# Patient Record
Sex: FEMALE | Race: WHITE | NOT HISPANIC OR LATINO | Employment: OTHER | URBAN - METROPOLITAN AREA
[De-identification: names, ages, dates, MRNs, and addresses within clinical notes are randomized per-mention and may not be internally consistent; named-entity substitution may affect disease eponyms.]

---

## 2023-08-18 NOTE — TELEPHONE ENCOUNTER
DIAGNOSIS: CARMEN patellofemoral syndromne    APPOINTMENT DATE: 09/13/2023   NOTES STATUS DETAILS   OFFICE NOTE from referring provider Care Everywhere 3/03/2023 [facility]    OFFICE NOTE from other specialist N/A    DISCHARGE SUMMARY from hospital N/A    DISCHARGE REPORT from the ER N/A    OPERATIVE REPORT N/A    EMG report N/A    MEDICATION LIST N/A    MRI N/A    DEXA (osteoporosis/bone health) N/A    CT SCAN N/A    XRAYS (IMAGES & REPORTS) Received 03/03/2023 bilateral knee     Images in PACS

## 2023-09-13 ENCOUNTER — PRE VISIT (OUTPATIENT)
Dept: ORTHOPEDICS | Facility: CLINIC | Age: 73
End: 2023-09-13

## 2023-09-13 ENCOUNTER — ANCILLARY PROCEDURE (OUTPATIENT)
Dept: GENERAL RADIOLOGY | Facility: CLINIC | Age: 73
End: 2023-09-13
Attending: PREVENTIVE MEDICINE
Payer: COMMERCIAL

## 2023-09-13 ENCOUNTER — OFFICE VISIT (OUTPATIENT)
Dept: ORTHOPEDICS | Facility: CLINIC | Age: 73
End: 2023-09-13
Payer: COMMERCIAL

## 2023-09-13 DIAGNOSIS — M25.562 BILATERAL CHRONIC KNEE PAIN: ICD-10-CM

## 2023-09-13 DIAGNOSIS — M54.16 LUMBAR RADICULAR PAIN: ICD-10-CM

## 2023-09-13 DIAGNOSIS — M54.16 LUMBAR RADICULAR PAIN: Primary | ICD-10-CM

## 2023-09-13 DIAGNOSIS — G89.29 BILATERAL CHRONIC KNEE PAIN: ICD-10-CM

## 2023-09-13 DIAGNOSIS — M17.0 PRIMARY OSTEOARTHRITIS OF BOTH KNEES: ICD-10-CM

## 2023-09-13 DIAGNOSIS — M25.561 BILATERAL CHRONIC KNEE PAIN: ICD-10-CM

## 2023-09-13 PROCEDURE — 99204 OFFICE O/P NEW MOD 45 MIN: CPT | Performed by: PREVENTIVE MEDICINE

## 2023-09-13 PROCEDURE — 72100 X-RAY EXAM L-S SPINE 2/3 VWS: CPT | Performed by: RADIOLOGY

## 2023-09-13 RX ORDER — LISINOPRIL 20 MG/1
20 TABLET ORAL DAILY
COMMUNITY

## 2023-09-13 RX ORDER — CALCIUM CARBONATE 500 MG/1
1 TABLET, CHEWABLE ORAL 2 TIMES DAILY
COMMUNITY

## 2023-09-13 RX ORDER — LORAZEPAM 1 MG/1
1 TABLET ORAL EVERY 6 HOURS PRN
COMMUNITY

## 2023-09-13 RX ORDER — SODIUM PHOSPHATE,MONO-DIBASIC 19G-7G/118
500 ENEMA (ML) RECTAL DAILY
COMMUNITY

## 2023-09-13 NOTE — PROGRESS NOTES
"HISTORY OF PRESENT ILLNESS  Ms. Hansen is a pleasant 72 year old year old female who presents to clinic today with the following:  What problem are you here for? Bilateral knee pain   How long have you had this problem? Chronic   And low back pain with some radicular symptoms  Has had cortisone injections in her knees in the past that have improved her knee pain symptoms for several months at a time, but the don't 'last'  Has completed PT for her knees without a great amount of improvement and currently using otc medications without much relief in knee pain    Have you had any recent imaging of this problem? Xrays/MRI/CT scans? Yes XR     Have you had treatments for this problem in the past?  -Medications? Aleve as needed   -Physical therapy? Yes  -Injections? Yes, has done gel and cortisone injections   -Surgery? no    How severe is this problem today? 0-10 scale? 1    How severe has this problem been at WORST in the past? 0-10 scale? 8    What do you think caused this problem? Is \"bone on bone\" in both knees     Does this problem or its symptoms cause difficulty for you falling asleep or staying asleep? Yes    Anything else you want us to know about this problem? no          MEDICAL HISTORY  There is no problem list on file for this patient.      No current outpatient medications on file.       Not on File    No family history on file.  Social History     Socioeconomic History    Marital status:        Additional medical/Social/Surgical histories reviewed in [facility] and updated as appropriate.     REVIEW OF SYSTEMS (9/13/2023)  10 point ROS of systems including Constitutional, Eyes, Respiratory, Cardiovascular, Gastroenterology, Genitourinary, Integumentary, Musculoskeletal, Psychiatric, Allergic/Immunologic were all negative except for pertinent positives noted in my HPI.     PHYSICAL EXAM  VSS  Vital Signs: There were no vitals taken for this visit. Patient declined being weighed. There is no height or " weight on file to calculate BMI.    General  - normal appearance, in no obvious distress  HEENT  - conjunctivae not injected, moist mucous membranes, normocephalic/atraumatic head, ears normal appearance, no lesions, mouth normal appearance, no scars, normal dentition and teeth present  CV  - normal popliteal pulse  Pulm  - normal respiratory pattern, non-labored  Musculoskeletal - bilateral knee  - stance: mildly antalgic gait, genu varum  - inspection: generalized swelling, trace effusion  - palpation: medial joint line tenderness, patella and patellar tendon non-tender, normal popliteal pulse  - ROM: 100 degrees flexion, 0 degrees extension, painful active ROM  - strength: 5/5 in flexion, 5/5 in extension  - neuro: no sensory or motor deficit  - special tests:  (-) Lachman  (-) anterior drawer  (-) posterior drawer  (-) pivot shift  (-) Jarvis  (-) Thessaly  (-) varus at 0 and 30 degrees flexion  (-) valgus at 0 and 30 degrees flexion  (+) Deion s compression test  (-) patellar apprehension  Neuro  - no sensory or motor deficit, grossly normal coordination, normal muscle tone  Skin  - no ecchymosis, erythema, warmth, or induration, no obvious rash  Psych  - interactive, appropriate, normal mood and affect   Lumbar: has some pain with flexion/extension, negative SLR today  ASSESSMENT & PLAN  71 yo female with bilateral knee arthritis, patellofemoral arthritis, chronic knee pain, and lumbar ddd, with radicular symptoms    I independently reviewed the following imaging studies:  Lumbar xrays: shows ddd  Bilateral knee xrays: shows arthritis, severe PF arthritis  Discussed and ordered HA injections to start treatment on her knees  Will consider lumbar MRI if low back and radicular symptoms get worse  Cont. Otc medications and voltaren gel PRN  Follow up for injections for knees once approved  Patient HAS in the past completed physical therapy for 4-6 weeks  Patient has been doing home exercise physical therapy  program for this problem      Appropriate PPE was utilized for prevention of spread of Covid-19.  Bruce Castillo MD, CAM

## 2023-09-13 NOTE — LETTER
"    9/13/2023         RE: Vernell Hansen  [address]  [facility] [address]        Dear Colleague,    Thank you for referring your patient, Vernlel Hansen, to the [facility] [place]. Please see a copy of my visit note below.    HISTORY OF PRESENT ILLNESS  Ms. Hansen is a pleasant 72 year old year old female who presents to clinic today with the following:  What problem are you here for? Bilateral knee pain   How long have you had this problem? Chronic   And low back pain with some radicular symptoms  Has had cortisone injections in her knees in the past that have improved her knee pain symptoms for several months at a time, but the don't 'last'  Has completed PT for her knees without a great amount of improvement and currently using otc medications without much relief in knee pain    Have you had any recent imaging of this problem? Xrays/MRI/CT scans? Yes XR     Have you had treatments for this problem in the past?  -Medications? Aleve as needed   -Physical therapy? Yes  -Injections? Yes, has done gel and cortisone injections   -Surgery? no    How severe is this problem today? 0-10 scale? 1    How severe has this problem been at WORST in the past? 0-10 scale? 8    What do you think caused this problem? Is \"bone on bone\" in both knees     Does this problem or its symptoms cause difficulty for you falling asleep or staying asleep? Yes    Anything else you want us to know about this problem? no          MEDICAL HISTORY  There is no problem list on file for this patient.      No current outpatient medications on file.       Not on File    No family history on file.  Social History     Socioeconomic History     Marital status:        Additional medical/Social/Surgical histories reviewed in [facility] and updated as appropriate.     REVIEW OF SYSTEMS (9/13/2023)  10 point ROS of systems including Constitutional, Eyes, Respiratory, Cardiovascular, Gastroenterology, Genitourinary, " Integumentary, Musculoskeletal, Psychiatric, Allergic/Immunologic were all negative except for pertinent positives noted in my HPI.     PHYSICAL EXAM  VSS  Vital Signs: There were no vitals taken for this visit. Patient declined being weighed. There is no height or weight on file to calculate BMI.    General  - normal appearance, in no obvious distress  HEENT  - conjunctivae not injected, moist mucous membranes, normocephalic/atraumatic head, ears normal appearance, no lesions, mouth normal appearance, no scars, normal dentition and teeth present  CV  - normal popliteal pulse  Pulm  - normal respiratory pattern, non-labored  Musculoskeletal - bilateral knee  - stance: mildly antalgic gait, genu varum  - inspection: generalized swelling, trace effusion  - palpation: medial joint line tenderness, patella and patellar tendon non-tender, normal popliteal pulse  - ROM: 100 degrees flexion, 0 degrees extension, painful active ROM  - strength: 5/5 in flexion, 5/5 in extension  - neuro: no sensory or motor deficit  - special tests:  (-) Lachman  (-) anterior drawer  (-) posterior drawer  (-) pivot shift  (-) Jarvis  (-) Thessaly  (-) varus at 0 and 30 degrees flexion  (-) valgus at 0 and 30 degrees flexion  (+) Deion s compression test  (-) patellar apprehension  Neuro  - no sensory or motor deficit, grossly normal coordination, normal muscle tone  Skin  - no ecchymosis, erythema, warmth, or induration, no obvious rash  Psych  - interactive, appropriate, normal mood and affect   Lumbar: has some pain with flexion/extension, negative SLR today  ASSESSMENT & PLAN  73 yo female with bilateral knee arthritis, patellofemoral arthritis, chronic knee pain, and lumbar ddd, with radicular symptoms    I independently reviewed the following imaging studies:  Lumbar xrays: shows ddd  Bilateral knee xrays: shows arthritis, severe PF arthritis  Discussed and ordered HA injections to start treatment on her knees  Will consider lumbar  MRI if low back and radicular symptoms get worse  Cont. Otc medications and voltaren gel PRN  Follow up for injections for knees once approved  Patient HAS in the past completed physical therapy for 4-6 weeks  Patient has been doing home exercise physical therapy program for this problem      Appropriate PPE was utilized for prevention of spread of Covid-19.  Bruce Castillo MD, CAQSM        Again, thank you for allowing me to participate in the care of your patient.        Sincerely,        Bruce Castillo MD

## 2023-09-15 ENCOUNTER — TELEPHONE (OUTPATIENT)
Dept: ORTHOPEDICS | Facility: CLINIC | Age: 73
End: 2023-09-15
Payer: COMMERCIAL

## 2023-09-15 NOTE — TELEPHONE ENCOUNTER
[facility] Call Center    Phone Message    May a detailed message be left on voicemail: yes     Reason for Call: Other: Patient would like to know if she's responsible for the prior authorization or who handles that? Please call patient back.     Action Taken: Message routed to:  [facility]  Sports Medicine    Travel Screening: Not Applicable

## 2023-09-18 ENCOUNTER — TELEPHONE (OUTPATIENT)
Dept: ORTHOPEDICS | Facility: CLINIC | Age: 73
End: 2023-09-18

## 2023-09-18 NOTE — TELEPHONE ENCOUNTER
Called and left message. Informed patient they are approved for their gel injections.        William Bonilla, EMT

## 2023-09-18 NOTE — TELEPHONE ENCOUNTER
Called and left message for patient, let her know that we are waiting to hear if he injection is approved still.        William Bonilla, EMT

## 2023-09-27 ENCOUNTER — TELEPHONE (OUTPATIENT)
Dept: ORTHOPEDICS | Facility: CLINIC | Age: 73
End: 2023-09-27
Payer: COMMERCIAL

## 2023-09-27 NOTE — TELEPHONE ENCOUNTER
[facility] Call Center    Phone Message    May a detailed message be left on voicemail: yes     Reason for Call: Other: Pt calling regarding injection questions would like to speak to member of care team     Action Taken: Message routed to:  Adult Clinics: Sports Medicine p [phone]    Travel Screening: Not Applicable

## 2023-09-27 NOTE — TELEPHONE ENCOUNTER
Called and spoke to patient, helped answer some questions she had about her upcoming appointment, in regards to cortisone versus euflexxa injections.      William Bonilla, EMT

## 2023-09-29 ENCOUNTER — THERAPY VISIT (OUTPATIENT)
Dept: PHYSICAL THERAPY | Facility: CLINIC | Age: 73
End: 2023-09-29
Payer: COMMERCIAL

## 2023-09-29 DIAGNOSIS — G89.29 CHRONIC PAIN OF BOTH KNEES: ICD-10-CM

## 2023-09-29 DIAGNOSIS — M25.562 BILATERAL KNEE PAIN: Primary | ICD-10-CM

## 2023-09-29 DIAGNOSIS — M25.561 BILATERAL KNEE PAIN: Primary | ICD-10-CM

## 2023-09-29 DIAGNOSIS — M25.562 CHRONIC PAIN OF BOTH KNEES: ICD-10-CM

## 2023-09-29 DIAGNOSIS — M54.16 LUMBAR RADICULAR PAIN: ICD-10-CM

## 2023-09-29 DIAGNOSIS — M25.561 CHRONIC PAIN OF BOTH KNEES: ICD-10-CM

## 2023-09-29 PROCEDURE — 97161 PT EVAL LOW COMPLEX 20 MIN: CPT | Mod: GP | Performed by: PHYSICAL THERAPIST

## 2023-09-29 PROCEDURE — 97110 THERAPEUTIC EXERCISES: CPT | Mod: GP | Performed by: PHYSICAL THERAPIST

## 2023-09-29 NOTE — PROGRESS NOTES
"PHYSICAL THERAPY EVALUATION  Type of Visit: Evaluation    See electronic medical record for Abuse and Falls Screening details.    Subjective       Presenting condition or subjective complaint: Bone on bone pain in both knees for many years. Stiffness has worsened as well as intensity and frequency of pain. She also reports low back pain across her central low back causing her to stoop forward and \"walk funny\". She reports throbbing pain into both lower legs at night especially that is a different pain than her knee joint pain.  Date of onset: 05/29/23    Relevant medical history:     Dates & types of surgery:      Prior diagnostic imaging/testing results:       Prior therapy history for the same diagnosis, illness or injury:        Prior Level of Function  Transfers:   Ambulation:   ADL:   IADL:     Living Environment  Social support:     Type of home:     Stairs to enter the home:         Ramp:     Stairs inside the home:         Help at home:    Equipment owned:       Employment:      Hobbies/Interests:       Patient goals for therapy:      Pain assessment: See objective evaluation for additional pain details     Objective   KNEE EVALUATION  PAIN: Pain Level at Rest: 8/10  Pain Level with Use: 4/10  Pain Location: knee, ankle, and foot   Pain Quality: throbbing  Pain Frequency: only at night in lower legs and with any bending related activities or prolonged walking with knees (but not every day)  Pain is Worst: nighttime  Pain is Exacerbated By: pain in most of lower leg at night when trying to sleep (different than knee pain); not sure if movement or position changes affect pain. Knee pain hurts with squats, stairs and too much walking  Pain is Relieved By: not sure  Pain Progression: Worsened    INTEGUMENTARY (edema, incisions):   POSTURE:   GAIT:  Weightbearing Status: WBAT  Assistive Device(s): None  Gait Deviations: Antalgic  BALANCE/PROPRIOCEPTION: WFL  WEIGHTBEARING ALIGNMENT:   NON-WEIGHTBEARING ALIGNMENT: "   ROM:   (Degrees) Left AROM Left PROM  Right AROM Right PROM   Knee Flexion 128 130 130 133   Knee Extension neutral  neutral    Pain:   End feel:       MDT for low back:  Flexion to floor (hamstring pulling); extension min loss without pain  SGIS min loss each direction (no pain)  -Repeated flexion: NE  -Repeated extension in standing: NE  -Prone on elbows: 2 minutes NE  -Knee to chest in supine: NE  STRENGTH:   Pain: - none + mild ++ moderate +++ severe  Strength Scale: 0-5/5 Left Right   Knee Flexion 5 5   Knee Extension 4 4   Quad Set 5 5     FLEXIBILITY: WNL  SPECIAL TESTS:  -Slump and SLR  FUNCTIONAL TESTS: Double Leg Squat: Anterior knee translation, Improper use of glutes/hips, and 50% of normal depth  PALPATION:  Superior patella and distal quad broadly (minor), no other focal tenderness (including low back today)  JOINT MOBILITY: hypomobile L3-5, L patella slightly hypomobile in all directions compared to right    Assessment & Plan   CLINICAL IMPRESSIONS  Medical Diagnosis: Lumbar radicular pain    Treatment Diagnosis: Bilateral knee pain, pain into both legs, low back pain   Impression/Assessment: Patient is a 72 year old female with knee/lower leg complaints.  The following significant findings have been identified: Pain, Decreased ROM/flexibility, Decreased joint mobility, Decreased strength, Impaired muscle performance, Decreased activity tolerance, and Impaired posture. These impairments interfere with their ability to perform self care tasks, recreational activities, household chores, and community mobility as compared to previous level of function.     Clinical Decision Making (Complexity):  Clinical Presentation: Stable/Uncomplicated  Clinical Presentation Rationale: based on medical and personal factors listed in PT evaluation  Clinical Decision Making (Complexity): Low complexity    PLAN OF CARE  Treatment Interventions:  Interventions: Manual Therapy, Neuromuscular Re-education, Therapeutic  Activity, Therapeutic Exercise, Self-Care/Home Management    Long Term Goals     PT Goal 1  Goal Identifier: LTG 1  Goal Description: Patient will be able to sleep throughout night without pain into either leg  Rationale: to maximize safety and independence with self cares  Target Date: 11/24/23      Frequency of Treatment: 1x/week  Duration of Treatment: 4 weeks then 2x/month for 2 months    Recommended Referrals to Other Professionals:  none  Education Assessment:   Learner/Method: No Barriers to Learning;Pictures/Video;Demonstration;Reading;Listening;Patient    Risks and benefits of evaluation/treatment have been explained.   Patient/Family/caregiver agrees with Plan of Care.     Evaluation Time:     PT Eval, Low Complexity Minutes (78091): 25       Signing Clinician: NINOSKA [facility]                                                                                   OUTPATIENT PHYSICAL THERAPY      PLAN OF TREATMENT FOR OUTPATIENT REHABILITATION   Patient's Last Name, First Name, Vernell Mary YOB: 1950   Provider's Name   [facility]   Medical Record No.  [id number]     Onset Date: 05/29/23  Start of Care Date: 09/29/23     Medical Diagnosis:  Lumbar radicular pain      PT Treatment Diagnosis:  Bilateral knee pain, pain into both legs, low back pain Plan of Treatment  Frequency/Duration: 1x/week/ 4 weeks then 2x/month for 2 months    Certification date from 09/29/23 to 12/22/23         See note for plan of treatment details and functional goals     True Reddy, PT                         I CERTIFY THE NEED FOR THESE SERVICES FURNISHED UNDER        THIS PLAN OF TREATMENT AND WHILE UNDER MY CARE     (Physician attestation of this document indicates review and certification of the therapy plan).                Referring Provider:  Bruce Castillo      Initial Assessment  See Epic Evaluation- Start of  Care Date: 09/29/23

## 2023-10-04 ENCOUNTER — OFFICE VISIT (OUTPATIENT)
Dept: ORTHOPEDICS | Facility: CLINIC | Age: 73
End: 2023-10-04
Payer: COMMERCIAL

## 2023-10-04 DIAGNOSIS — M17.0 ARTHRITIS OF BOTH KNEES: Primary | ICD-10-CM

## 2023-10-04 PROCEDURE — 99207 PR DROP WITH A PROCEDURE: CPT | Performed by: PREVENTIVE MEDICINE

## 2023-10-04 PROCEDURE — 20610 DRAIN/INJ JOINT/BURSA W/O US: CPT | Mod: 50 | Performed by: PREVENTIVE MEDICINE

## 2023-10-04 RX ORDER — TRIAMCINOLONE ACETONIDE 40 MG/ML
40 INJECTION, SUSPENSION INTRA-ARTICULAR; INTRAMUSCULAR
Status: SHIPPED | OUTPATIENT
Start: 2023-10-04

## 2023-10-04 RX ADMIN — TRIAMCINOLONE ACETONIDE 40 MG: 40 INJECTION, SUSPENSION INTRA-ARTICULAR; INTRAMUSCULAR at 14:52

## 2023-10-04 NOTE — PROGRESS NOTES
HISTORY OF PRESENT ILLNESS  Ms. Hansen is a pleasant 72 year old year old female who presents to clinic today with the following:  What problem are you here for? Bilateral knee cortisone   Has been approved to start euflexxa, however, she has a trip coming up and her knees have both been more sore with walking and she would like to get cortisone injections today and followup for euflexxa injections after her trip  How long have you had this problem? Chronic     Have you had any recent imaging of this problem? Xrays/MRI/CT scans? Yes     Have you had treatments for this problem in the past?  -Medications? Aleve   -Physical therapy? Yes   -Injections? Yes   -Surgery? no    How severe is this problem today? 0-10 scale? 1    How severe has this problem been at WORST in the past? 0-10 scale? 8    What do you think caused this problem?     Does this problem or its symptoms cause difficulty for you falling asleep or staying asleep? Primary OA in both knees     Anything else you want us to know about this problem? Na           MEDICAL HISTORY  Patient Active Problem List   Diagnosis    Bilateral knee pain    Lumbar radicular pain    Chronic pain of both knees       Current Outpatient Medications   Medication Sig Dispense Refill    calcium carbonate (TUMS) 500 MG chewable tablet Take 1 chew tab by mouth 2 times daily      glucosamine 500 MG CAPS capsule Take 500 mg by mouth daily      lisinopril (ZESTRIL) 20 MG tablet Take 20 mg by mouth daily      LORazepam (ATIVAN) 1 MG tablet Take 1 mg by mouth every 6 hours as needed for anxiety         No Known Allergies    No family history on file.  Social History     Socioeconomic History    Marital status:        Additional medical/Social/Surgical histories reviewed in [facility] and updated as appropriate.     REVIEW OF SYSTEMS (10/4/2023)  10 point ROS of systems including Constitutional, Eyes, Respiratory, Cardiovascular, Gastroenterology, Genitourinary, Integumentary,  Musculoskeletal, Psychiatric, Allergic/Immunologic were all negative except for pertinent positives noted in my HPI.     PHYSICAL EXAM  VSS    General  - normal appearance, in no obvious distress  HEENT  - conjunctivae not injected, moist mucous membranes, normocephalic/atraumatic head, ears normal appearance, no lesions, mouth normal appearance, no scars, normal dentition and teeth present  CV  - normal popliteal pulse  Pulm  - normal respiratory pattern, non-labored  Musculoskeletal - bilateral knee  - stance: mildly antalgic gait, genu varum  - inspection: no generalized swelling, trace effusion  - palpation: medial joint line tenderness, patella and patellar tendon non-tender, normal popliteal pulse  - ROM: 100 degrees flexion, 0 degrees extension, painful active ROM  - strength: 5/5 in flexion, 5/5 in extension  - neuro: no sensory or motor deficit  - special tests:  (-) Lachman  (-) anterior drawer  (-) posterior drawer  (-) pivot shift  (-) Jarvis  (-) Thessaly  (-) varus at 0 and 30 degrees flexion  (-) valgus at 0 and 30 degrees flexion  (+) Deion s compression test  (-) patellar apprehension  Neuro  - no sensory or motor deficit, grossly normal coordination, normal muscle tone  Skin  - no ecchymosis, erythema, warmth, or induration, no obvious rash  Psych  - interactive, appropriate, normal mood and affect      ASSESSMENT & PLAN  71 yo female with bilateral knee arthritis, worse    I independently reviewed the following imaging studies:  Bilateral knee xrays: shows arthritis  After a 20 minute discussion and examination, we decided to perform a same day injection for diagnostic and therapeutic purposes for  Bilateral knees  Discussed and will start euflexxa later this month  Patient has been doing home exercise physical therapy program for this problem      Appropriate PPE was utilized for prevention of spread of Covid-19.  Bruec Castillo MD, CAQSM      PROCEDURE:      bilateral      Knee joint injection    The patient was apprised of the risks and the benefits of the procedure and consented and a written consent was signed.   The patient s  KNEEs were sterilely prepped with chloraprep.   40 mg of triamcinolone suspension was drawn up into a 5 mL syringe with 4 mL of 1% lidocaine for each knee  The patient was injected into the R and L knee with a 1.5-inch 22-gauge needle at the_superiorlateral aspect of their knee joints  There were no complications. The patient tolerated the procedure well. There was minimal bleeding.   The patient was instructed to ice the knees upon leaving clinic and refrain from overuse over the next 3 days.   The patient was instructed to go to the emergency room with any usual pain, swelling, or redness occurred in the injected area.   The patient was given a followup appointment to evaluate response to the injection to their increased range of motion and reduction of pain.  Follow up PRN  Dr Bruce Castillo   Large Joint Injection/Arthocentesis: bilateral knee    Date/Time: 10/4/2023 2:52 PM    Performed by: Bruce Castillo MD  Authorized by: Bruce Castillo MD    Indications:  Osteoarthritis and pain  Needle Size:  22 G  Guidance: landmark guided    Approach:  Superolateral  Location:  Knee  Laterality:  Bilateral      Medications (Right):  40 mg triamcinolone 40 MG/ML  Medications (Left):  40 mg triamcinolone 40 MG/ML  Outcome:  Tolerated well, no immediate complications  Procedure discussed: discussed risks, benefits, and alternatives    Consent Given by:  Patient  Timeout: timeout called immediately prior to procedure    Prep: patient was prepped and draped in usual sterile fashion

## 2023-10-04 NOTE — LETTER
10/4/2023         RE: Vernell Hansen  [address]  [facility] [address]        Dear Colleague,    Thank you for referring your patient, Vernell Hansen, to the [facility]IN[facility] [place]. Please see a copy of my visit note below.    HISTORY OF PRESENT ILLNESS  Ms. Hansen is a pleasant 72 year old year old female who presents to clinic today with the following:  What problem are you here for? Bilateral knee cortisone   Has been approved to start euflexxa, however, she has a trip coming up and her knees have both been more sore with walking and she would like to get cortisone injections today and followup for euflexxa injections after her trip  How long have you had this problem? Chronic     Have you had any recent imaging of this problem? Xrays/MRI/CT scans? Yes     Have you had treatments for this problem in the past?  -Medications? Aleve   -Physical therapy? Yes   -Injections? Yes   -Surgery? no    How severe is this problem today? 0-10 scale? 1    How severe has this problem been at WORST in the past? 0-10 scale? 8    What do you think caused this problem?     Does this problem or its symptoms cause difficulty for you falling asleep or staying asleep? Primary OA in both knees     Anything else you want us to know about this problem? Na           MEDICAL HISTORY  Patient Active Problem List   Diagnosis     Bilateral knee pain     Lumbar radicular pain     Chronic pain of both knees       Current Outpatient Medications   Medication Sig Dispense Refill     calcium carbonate (TUMS) 500 MG chewable tablet Take 1 chew tab by mouth 2 times daily       glucosamine 500 MG CAPS capsule Take 500 mg by mouth daily       lisinopril (ZESTRIL) 20 MG tablet Take 20 mg by mouth daily       LORazepam (ATIVAN) 1 MG tablet Take 1 mg by mouth every 6 hours as needed for anxiety         No Known Allergies    No family history on file.  Social History     Socioeconomic History     Marital status:         Additional medical/Social/Surgical histories reviewed in [facility] and updated as appropriate.     REVIEW OF SYSTEMS (10/4/2023)  10 point ROS of systems including Constitutional, Eyes, Respiratory, Cardiovascular, Gastroenterology, Genitourinary, Integumentary, Musculoskeletal, Psychiatric, Allergic/Immunologic were all negative except for pertinent positives noted in my HPI.     PHYSICAL EXAM  VSS    General  - normal appearance, in no obvious distress  HEENT  - conjunctivae not injected, moist mucous membranes, normocephalic/atraumatic head, ears normal appearance, no lesions, mouth normal appearance, no scars, normal dentition and teeth present  CV  - normal popliteal pulse  Pulm  - normal respiratory pattern, non-labored  Musculoskeletal - bilateral knee  - stance: mildly antalgic gait, genu varum  - inspection: no generalized swelling, trace effusion  - palpation: medial joint line tenderness, patella and patellar tendon non-tender, normal popliteal pulse  - ROM: 100 degrees flexion, 0 degrees extension, painful active ROM  - strength: 5/5 in flexion, 5/5 in extension  - neuro: no sensory or motor deficit  - special tests:  (-) Lachman  (-) anterior drawer  (-) posterior drawer  (-) pivot shift  (-) Jarvis  (-) Thessaly  (-) varus at 0 and 30 degrees flexion  (-) valgus at 0 and 30 degrees flexion  (+) Deion s compression test  (-) patellar apprehension  Neuro  - no sensory or motor deficit, grossly normal coordination, normal muscle tone  Skin  - no ecchymosis, erythema, warmth, or induration, no obvious rash  Psych  - interactive, appropriate, normal mood and affect      ASSESSMENT & PLAN  73 yo female with bilateral knee arthritis, worse    I independently reviewed the following imaging studies:  Bilateral knee xrays: shows arthritis  After a 20 minute discussion and examination, we decided to perform a same day injection for diagnostic and therapeutic purposes for  Bilateral knees  Discussed  and will start euflexxa later this month  Patient has been doing home exercise physical therapy program for this problem      Appropriate PPE was utilized for prevention of spread of Covid-19.  Bruce Castillo MD, [facility]      PROCEDURE:      bilateral      Knee joint injection   The patient was apprised of the risks and the benefits of the procedure and consented and a written consent was signed.   The patient s  KNEEs were sterilely prepped with chloraprep.   40 mg of triamcinolone suspension was drawn up into a 5 mL syringe with 4 mL of 1% lidocaine for each knee  The patient was injected into the R and L knee with a 1.5-inch 22-gauge needle at the_superiorlateral aspect of their knee joints  There were no complications. The patient tolerated the procedure well. There was minimal bleeding.   The patient was instructed to ice the knees upon leaving clinic and refrain from overuse over the next 3 days.   The patient was instructed to go to the emergency room with any usual pain, swelling, or redness occurred in the injected area.   The patient was given a followup appointment to evaluate response to the injection to their increased range of motion and reduction of pain.  Follow up PRN  Dr Bruce Castillo   Large Joint Injection/Arthocentesis: bilateral knee    Date/Time: 10/4/2023 2:52 PM    Performed by: Bruce Castillo MD  Authorized by: Bruce Castillo MD    Indications:  Osteoarthritis and pain  Needle Size:  22 G  Guidance: landmark guided    Approach:  Superolateral  Location:  Knee  Laterality:  Bilateral      Medications (Right):  40 mg triamcinolone 40 MG/ML  Medications (Left):  40 mg triamcinolone 40 MG/ML  Outcome:  Tolerated well, no immediate complications  Procedure discussed: discussed risks, benefits, and alternatives    Consent Given by:  Patient  Timeout: timeout called immediately prior to procedure    Prep: patient was prepped and draped in usual sterile fashion          Again, thank  you for allowing me to participate in the care of your patient.        Sincerely,        Bruce Castillo MD

## 2023-10-13 ENCOUNTER — THERAPY VISIT (OUTPATIENT)
Dept: PHYSICAL THERAPY | Facility: CLINIC | Age: 73
End: 2023-10-13
Payer: COMMERCIAL

## 2023-10-13 DIAGNOSIS — M25.562 CHRONIC PAIN OF BOTH KNEES: Primary | ICD-10-CM

## 2023-10-13 DIAGNOSIS — G89.29 CHRONIC PAIN OF BOTH KNEES: Primary | ICD-10-CM

## 2023-10-13 DIAGNOSIS — M25.561 CHRONIC PAIN OF BOTH KNEES: Primary | ICD-10-CM

## 2023-10-13 PROCEDURE — 97110 THERAPEUTIC EXERCISES: CPT | Mod: GP | Performed by: PHYSICAL THERAPIST

## 2023-10-25 ENCOUNTER — OFFICE VISIT (OUTPATIENT)
Dept: ORTHOPEDICS | Facility: CLINIC | Age: 73
End: 2023-10-25
Payer: COMMERCIAL

## 2023-10-25 DIAGNOSIS — M17.0 ARTHRITIS OF BOTH KNEES: Primary | ICD-10-CM

## 2023-10-25 PROCEDURE — 99207 PR DROP WITH A PROCEDURE: CPT | Performed by: PREVENTIVE MEDICINE

## 2023-10-25 PROCEDURE — 20610 DRAIN/INJ JOINT/BURSA W/O US: CPT | Mod: 50 | Performed by: PREVENTIVE MEDICINE

## 2023-10-25 RX ADMIN — Medication 20 MG: at 15:10

## 2023-10-25 NOTE — LETTER
10/25/2023         RE: Vernell Hansen  [address]  [facility] [address]        Dear Colleague,    Thank you for referring your patient, Vernell Hansen, to the [facility] [place]. Please see a copy of my visit note below.    Vernell returns for bilateral knee injections with euflexxa #1  Diagnosis: bilateral knee arthritis    PROCEDURE:           bilateral  Knee joint injection with euflexxa #1    The patient was apprised of the risks and the benefits of the procedure and consented and a written consent was signed.   The patient s  KNEEs were sterilely prepped with chloraprep.     The patient was injected with euflexxa syringe into the right and left knee with a 1.5-inch 22-gauge needle at the_superiorlateral aspect of their knee joint    There were no complications. The patient tolerated the procedure well. There was minimal bleeding.   The patient was instructed to ice the knees upon leaving clinic and refrain from overuse over the next 3 days.   The patient was instructed to go to the emergency room with any usual pain, swelling, or redness occurred in the injected area.   The patient was given a followup appointment to evaluate response to the injection to their increased range of motion and reduction of pain.  Follow up for euflexxa  Dr Bruce Castillo       Large Joint Injection/Arthocentesis: bilateral knee    Date/Time: 10/25/2023 3:10 PM    Performed by: Bruce Castillo MD  Authorized by: Bruce Castillo MD    Indications:  Pain and osteoarthritis  Needle Size:  22 G  Guidance: landmark guided    Approach:  Superolateral  Location:  Knee  Laterality:  Bilateral      Medications (Right):  20 mg sodium hyaluronate 20 MG/2ML  Medications (Left):  20 mg sodium hyaluronate 20 MG/2ML  Outcome:  Tolerated well, no immediate complications  Procedure discussed: discussed risks, benefits, and alternatives    Consent Given by:  Patient  Timeout: timeout called  immediately prior to procedure    Prep: patient was prepped and draped in usual sterile fashion          Again, thank you for allowing me to participate in the care of your patient.        Sincerely,        Bruce Castillo MD

## 2023-10-25 NOTE — PROGRESS NOTES
Vernell returns for bilateral knee injections with euflexxa #1  Diagnosis: bilateral knee arthritis    PROCEDURE:           bilateral  Knee joint injection with euflexxa #1    The patient was apprised of the risks and the benefits of the procedure and consented and a written consent was signed.   The patient s  KNEEs were sterilely prepped with chloraprep.     The patient was injected with euflexxa syringe into the right and left knee with a 1.5-inch 22-gauge needle at the_superiorlateral aspect of their knee joint    There were no complications. The patient tolerated the procedure well. There was minimal bleeding.   The patient was instructed to ice the knees upon leaving clinic and refrain from overuse over the next 3 days.   The patient was instructed to go to the emergency room with any usual pain, swelling, or redness occurred in the injected area.   The patient was given a followup appointment to evaluate response to the injection to their increased range of motion and reduction of pain.  Follow up for euflexxa  Dr Bruce Castillo       Large Joint Injection/Arthocentesis: bilateral knee    Date/Time: 10/25/2023 3:10 PM    Performed by: Bruce Castillo MD  Authorized by: Bruce Castillo MD    Indications:  Pain and osteoarthritis  Needle Size:  22 G  Guidance: landmark guided    Approach:  Superolateral  Location:  Knee  Laterality:  Bilateral      Medications (Right):  20 mg sodium hyaluronate 20 MG/2ML  Medications (Left):  20 mg sodium hyaluronate 20 MG/2ML  Outcome:  Tolerated well, no immediate complications  Procedure discussed: discussed risks, benefits, and alternatives    Consent Given by:  Patient  Timeout: timeout called immediately prior to procedure    Prep: patient was prepped and draped in usual sterile fashion

## 2023-10-26 RX ORDER — HYALURONATE SODIUM 10 MG/ML
20 SYRINGE (ML) INTRAARTICULAR
Status: SHIPPED | OUTPATIENT
Start: 2023-10-25

## 2023-10-27 ENCOUNTER — THERAPY VISIT (OUTPATIENT)
Dept: PHYSICAL THERAPY | Facility: CLINIC | Age: 73
End: 2023-10-27
Payer: COMMERCIAL

## 2023-10-27 DIAGNOSIS — G89.29 CHRONIC PAIN OF BOTH KNEES: Primary | ICD-10-CM

## 2023-10-27 DIAGNOSIS — M25.561 CHRONIC PAIN OF BOTH KNEES: Primary | ICD-10-CM

## 2023-10-27 DIAGNOSIS — M25.562 CHRONIC PAIN OF BOTH KNEES: Primary | ICD-10-CM

## 2023-10-27 PROCEDURE — 97110 THERAPEUTIC EXERCISES: CPT | Mod: GP | Performed by: PHYSICAL THERAPIST

## 2023-10-27 PROCEDURE — 97140 MANUAL THERAPY 1/> REGIONS: CPT | Mod: GP | Performed by: PHYSICAL THERAPIST

## 2023-11-01 ENCOUNTER — OFFICE VISIT (OUTPATIENT)
Dept: ORTHOPEDICS | Facility: CLINIC | Age: 73
End: 2023-11-01
Payer: COMMERCIAL

## 2023-11-01 DIAGNOSIS — M17.0 ARTHRITIS OF BOTH KNEES: Primary | ICD-10-CM

## 2023-11-01 PROCEDURE — 99207 PR DROP WITH A PROCEDURE: CPT | Performed by: PREVENTIVE MEDICINE

## 2023-11-01 PROCEDURE — 20610 DRAIN/INJ JOINT/BURSA W/O US: CPT | Mod: 50 | Performed by: PREVENTIVE MEDICINE

## 2023-11-01 RX ORDER — HYALURONATE SODIUM 10 MG/ML
20 SYRINGE (ML) INTRAARTICULAR
Status: SHIPPED | OUTPATIENT
Start: 2023-11-01

## 2023-11-01 RX ADMIN — Medication 20 MG: at 15:03

## 2023-11-01 NOTE — LETTER
11/1/2023         RE: Vernell Hansen  [address]  [facility] [address]        Dear Colleague,    Thank you for referring your patient, Vernell Hansen, to the [facility]IC[facility]IN[facility] [place]. Please see a copy of my visit note below.    Vernell returns for bilateral knee injections with euflexxa #2  Doing better  Diagnosis: bilateral knee arthritis    PROCEDURE:           bilateral  Knee joint injection with euflexxa    The patient was apprised of the risks and the benefits of the procedure and consented and a written consent was signed.   The patient s  KNEEs were sterilely prepped with chloraprep.     The patient was injected with euflexxa syringe into the right and left knee with a 1.5-inch 22-gauge needle at the_superiorlateral aspect of their knee joint    There were no complications. The patient tolerated the procedure well. There was minimal bleeding.   The patient was instructed to ice the knees upon leaving clinic and refrain from overuse over the next 3 days.   The patient was instructed to go to the emergency room with any usual pain, swelling, or redness occurred in the injected area.   The patient was given a followup appointment to evaluate response to the injection to their increased range of motion and reduction of pain.  Follow up for euflexxa  Dr Bruce Castillo       Large Joint Injection/Arthocentesis: bilateral knee    Date/Time: 11/1/2023 3:03 PM    Performed by: Bruce Castillo MD  Authorized by: Bruce Castillo MD    Indications:  Osteoarthritis and pain  Needle Size:  22 G  Guidance: landmark guided    Approach:  Superolateral  Location:  Knee  Laterality:  Bilateral      Medications (Right):  20 mg sodium hyaluronate 20 MG/2ML  Medications (Left):  20 mg sodium hyaluronate 20 MG/2ML  Outcome:  Tolerated well, no immediate complications  Procedure discussed: discussed risks, benefits, and alternatives    Consent Given by:  Patient  Timeout: timeout  called immediately prior to procedure    Prep: patient was prepped and draped in usual sterile fashion          Again, thank you for allowing me to participate in the care of your patient.        Sincerely,        Bruce Castillo MD

## 2023-11-01 NOTE — PROGRESS NOTES
Vernell returns for bilateral knee injections with euflexxa #2  Doing better  Diagnosis: bilateral knee arthritis    PROCEDURE:           bilateral  Knee joint injection with euflexxa    The patient was apprised of the risks and the benefits of the procedure and consented and a written consent was signed.   The patient s  KNEEs were sterilely prepped with chloraprep.     The patient was injected with euflexxa syringe into the right and left knee with a 1.5-inch 22-gauge needle at the_superiorlateral aspect of their knee joint    There were no complications. The patient tolerated the procedure well. There was minimal bleeding.   The patient was instructed to ice the knees upon leaving clinic and refrain from overuse over the next 3 days.   The patient was instructed to go to the emergency room with any usual pain, swelling, or redness occurred in the injected area.   The patient was given a followup appointment to evaluate response to the injection to their increased range of motion and reduction of pain.  Follow up for euflexxa  Dr Bruce Castillo       Large Joint Injection/Arthocentesis: bilateral knee    Date/Time: 11/1/2023 3:03 PM    Performed by: Bruce Castillo MD  Authorized by: Bruce Castillo MD    Indications:  Osteoarthritis and pain  Needle Size:  22 G  Guidance: landmark guided    Approach:  Superolateral  Location:  Knee  Laterality:  Bilateral      Medications (Right):  20 mg sodium hyaluronate 20 MG/2ML  Medications (Left):  20 mg sodium hyaluronate 20 MG/2ML  Outcome:  Tolerated well, no immediate complications  Procedure discussed: discussed risks, benefits, and alternatives    Consent Given by:  Patient  Timeout: timeout called immediately prior to procedure    Prep: patient was prepped and draped in usual sterile fashion

## 2023-11-03 ENCOUNTER — THERAPY VISIT (OUTPATIENT)
Dept: PHYSICAL THERAPY | Facility: CLINIC | Age: 73
End: 2023-11-03
Payer: COMMERCIAL

## 2023-11-03 DIAGNOSIS — M25.561 CHRONIC PAIN OF BOTH KNEES: Primary | ICD-10-CM

## 2023-11-03 DIAGNOSIS — G89.29 CHRONIC PAIN OF BOTH KNEES: Primary | ICD-10-CM

## 2023-11-03 DIAGNOSIS — M25.562 CHRONIC PAIN OF BOTH KNEES: Primary | ICD-10-CM

## 2023-11-03 PROCEDURE — 97110 THERAPEUTIC EXERCISES: CPT | Mod: GP | Performed by: PHYSICAL THERAPIST

## 2023-11-03 PROCEDURE — 97530 THERAPEUTIC ACTIVITIES: CPT | Mod: GP | Performed by: PHYSICAL THERAPIST

## 2023-11-17 ENCOUNTER — THERAPY VISIT (OUTPATIENT)
Dept: PHYSICAL THERAPY | Facility: CLINIC | Age: 73
End: 2023-11-17
Payer: COMMERCIAL

## 2023-11-17 DIAGNOSIS — G89.29 CHRONIC PAIN OF BOTH KNEES: Primary | ICD-10-CM

## 2023-11-17 DIAGNOSIS — M25.562 CHRONIC PAIN OF BOTH KNEES: Primary | ICD-10-CM

## 2023-11-17 DIAGNOSIS — M25.561 CHRONIC PAIN OF BOTH KNEES: Primary | ICD-10-CM

## 2023-11-17 PROCEDURE — 97110 THERAPEUTIC EXERCISES: CPT | Mod: GP | Performed by: PHYSICAL THERAPIST

## 2023-12-01 ENCOUNTER — THERAPY VISIT (OUTPATIENT)
Dept: PHYSICAL THERAPY | Facility: CLINIC | Age: 73
End: 2023-12-01
Payer: COMMERCIAL

## 2023-12-01 DIAGNOSIS — G89.29 CHRONIC PAIN OF BOTH KNEES: Primary | ICD-10-CM

## 2023-12-01 DIAGNOSIS — M25.561 CHRONIC PAIN OF BOTH KNEES: Primary | ICD-10-CM

## 2023-12-01 DIAGNOSIS — M25.562 CHRONIC PAIN OF BOTH KNEES: Primary | ICD-10-CM

## 2023-12-01 PROCEDURE — 97110 THERAPEUTIC EXERCISES: CPT | Mod: GP | Performed by: PHYSICAL THERAPIST

## 2023-12-04 ENCOUNTER — OFFICE VISIT (OUTPATIENT)
Dept: ORTHOPEDICS | Facility: CLINIC | Age: 73
End: 2023-12-04
Payer: COMMERCIAL

## 2023-12-04 DIAGNOSIS — M17.0 ARTHRITIS OF BOTH KNEES: Primary | ICD-10-CM

## 2023-12-04 PROCEDURE — 20610 DRAIN/INJ JOINT/BURSA W/O US: CPT | Mod: 50 | Performed by: PREVENTIVE MEDICINE

## 2023-12-04 PROCEDURE — 99207 PR DROP WITH A PROCEDURE: CPT | Performed by: PREVENTIVE MEDICINE

## 2023-12-04 RX ORDER — HYALURONATE SODIUM 10 MG/ML
20 SYRINGE (ML) INTRAARTICULAR
Status: SHIPPED | OUTPATIENT
Start: 2023-12-04

## 2023-12-04 RX ADMIN — Medication 20 MG: at 12:14

## 2023-12-04 ASSESSMENT — PAIN SCALES - GENERAL: PAINLEVEL: SEVERE PAIN (7)

## 2023-12-04 NOTE — NURSING NOTE
[facility]   ORTHOPEDICS & SPORTS MEDICINE  [address] [place] [address]  Dept: [phone]  ______________________________________________________________________________    Patient: Vernell Hansen   : 1950   MRN: [id number]   2023    INVASIVE PROCEDURE SAFETY CHECKLIST    Date: 2023   Procedure: Bilateral knee injection  Patient Name: Vernell Hansen  MRN: [id number]  YOB: 1950    Action: Complete sections as appropriate. Any discrepancy results in a HARD COPY until resolved.     PRE PROCEDURE:  Patient ID verified with 2 identifiers (name and  or MRN): Yes  Procedure and site verified with patient/designee (when able): Yes  Accurate consent documentation in medical record: Yes  H&P (or appropriate assessment) documented in medical record: Yes  H&P must be up to 20 days prior to procedure and updates within 24 hours of procedure as applicable: NA  Relevant diagnostic and radiology test results appropriately labeled and displayed as applicable: NA  Procedure site(s) marked with provider initials: NA    TIMEOUT:  Time-Out performed immediately prior to starting procedure, including verbal and active participation of all team members addressing the following:Yes  * Correct patient identify  * Confirmed that the correct side and site are marked  * An accurate procedure consent form  * Agreement on the procedure to be done  * Correct patient position  * Relevant images and results are properly labeled and appropriately displayed  * The need to administer antibiotics or fluids for irrigation purposes during the procedure as applicable   * Safety precautions based on patient history or medication use    DURING PROCEDURE: Verification of correct person, site, and procedures any time the responsibility for care of the patient is transferred to another member of the care team.       Prior to injection, verified patient identity using patient's name and date of  birth.  Due to injection administration, patient instructed to remain in clinic for 15 minutes  afterwards, and to report any adverse reaction to me immediately.    Joint injection was performed.      Drug Amount Wasted:  None.  Vial/Syringe: Syringe  Expiration Date:  10/9/2024      Tony Leigh, EMT  December 4, 2023

## 2023-12-04 NOTE — LETTER
12/4/2023         RE: Vernell Hansen  [address]  [facility] [address]        Dear Colleague,    Thank you for referring your patient, Vernell Hansen, to the [facility] MEDICINE [facility]IN[facility] [place]. Please see a copy of my visit note below.    Vernell returns for Euflexxa #3 for both knees  Doing better  Diagnosis: bilateral knee arthritis  PROCEDURE:           bilateral  Knee joint injection with euflexxa    The patient was apprised of the risks and the benefits of the procedure and consented and a written consent was signed.   The patient s  KNEEs were sterilely prepped with chloraprep.     The patient was injected with euflexxa syringe into the right and left knee with a 1.5-inch 22-gauge needle at the_superiorlateral aspect of their knee joint    There were no complications. The patient tolerated the procedure well. There was minimal bleeding.   The patient was instructed to ice the knees upon leaving clinic and refrain from overuse over the next 3 days.   The patient was instructed to go to the emergency room with any usual pain, swelling, or redness occurred in the injected area.   The patient was given a followup appointment to evaluate response to the injection to their increased range of motion and reduction of pain.  Follow up for euflexxa  Dr Bruce Castillo             Large Joint Injection/Arthocentesis: bilateral knee    Date/Time: 12/4/2023 12:14 PM    Performed by: Burce Castillo MD  Authorized by: Bruce Castillo MD    Indications:  Pain and osteoarthritis  Needle Size:  22 G  Guidance: landmark guided    Approach:  Superolateral  Location:  Knee  Laterality:  Bilateral      Medications (Right):  20 mg sodium hyaluronate 20 MG/2ML  Medications (Left):  20 mg sodium hyaluronate 20 MG/2ML  Outcome:  Tolerated well, no immediate complications  Procedure discussed: discussed risks, benefits, and alternatives    Consent Given by:  Patient  Timeout: timeout called  immediately prior to procedure    Prep: patient was prepped and draped in usual sterile fashion              Again, thank you for allowing me to participate in the care of your patient.        Sincerely,        Bruce Castillo MD

## 2023-12-04 NOTE — PROGRESS NOTES
Vernell returns for Euflexxa #3 for both knees  Doing better  Diagnosis: bilateral knee arthritis  PROCEDURE:           bilateral  Knee joint injection with euflexxa    The patient was apprised of the risks and the benefits of the procedure and consented and a written consent was signed.   The patient s  KNEEs were sterilely prepped with chloraprep.     The patient was injected with euflexxa syringe into the right and left knee with a 1.5-inch 22-gauge needle at the_superiorlateral aspect of their knee joint    There were no complications. The patient tolerated the procedure well. There was minimal bleeding.   The patient was instructed to ice the knees upon leaving clinic and refrain from overuse over the next 3 days.   The patient was instructed to go to the emergency room with any usual pain, swelling, or redness occurred in the injected area.   The patient was given a followup appointment to evaluate response to the injection to their increased range of motion and reduction of pain.  Follow up for euflexxa  Dr Bruce Castillo             Large Joint Injection/Arthocentesis: bilateral knee    Date/Time: 12/4/2023 12:14 PM    Performed by: Bruce Castillo MD  Authorized by: Bruce Castillo MD    Indications:  Pain and osteoarthritis  Needle Size:  22 G  Guidance: landmark guided    Approach:  Superolateral  Location:  Knee  Laterality:  Bilateral      Medications (Right):  20 mg sodium hyaluronate 20 MG/2ML  Medications (Left):  20 mg sodium hyaluronate 20 MG/2ML  Outcome:  Tolerated well, no immediate complications  Procedure discussed: discussed risks, benefits, and alternatives    Consent Given by:  Patient  Timeout: timeout called immediately prior to procedure    Prep: patient was prepped and draped in usual sterile fashion

## 2023-12-04 NOTE — NURSING NOTE
Chief Complaint   Patient presents with    Left Knee - Follow Up, Pain    Right Knee - Pain, Follow Up       There were no vitals filed for this visit.    There is no height or weight on file to calculate BMI.      ISIDRO Jimenez NREMT

## 2023-12-11 ENCOUNTER — THERAPY VISIT (OUTPATIENT)
Dept: PHYSICAL THERAPY | Facility: CLINIC | Age: 73
End: 2023-12-11
Payer: COMMERCIAL

## 2023-12-11 DIAGNOSIS — M25.561 CHRONIC PAIN OF BOTH KNEES: Primary | ICD-10-CM

## 2023-12-11 DIAGNOSIS — G89.29 CHRONIC PAIN OF BOTH KNEES: Primary | ICD-10-CM

## 2023-12-11 DIAGNOSIS — M25.562 CHRONIC PAIN OF BOTH KNEES: Primary | ICD-10-CM

## 2023-12-11 PROCEDURE — 97530 THERAPEUTIC ACTIVITIES: CPT | Mod: GP | Performed by: PHYSICAL THERAPIST

## 2023-12-11 PROCEDURE — 97110 THERAPEUTIC EXERCISES: CPT | Mod: 59 | Performed by: PHYSICAL THERAPIST

## 2023-12-29 ENCOUNTER — THERAPY VISIT (OUTPATIENT)
Dept: PHYSICAL THERAPY | Facility: CLINIC | Age: 73
End: 2023-12-29
Payer: COMMERCIAL

## 2023-12-29 DIAGNOSIS — G89.29 CHRONIC PAIN OF BOTH KNEES: Primary | ICD-10-CM

## 2023-12-29 DIAGNOSIS — M25.562 CHRONIC PAIN OF BOTH KNEES: Primary | ICD-10-CM

## 2023-12-29 DIAGNOSIS — M25.561 CHRONIC PAIN OF BOTH KNEES: Primary | ICD-10-CM

## 2023-12-29 PROCEDURE — 97110 THERAPEUTIC EXERCISES: CPT | Mod: GP | Performed by: PHYSICAL THERAPIST

## 2023-12-29 PROCEDURE — 97530 THERAPEUTIC ACTIVITIES: CPT | Mod: GP | Performed by: PHYSICAL THERAPIST

## 2023-12-29 NOTE — PROGRESS NOTES
Physical Therapy Progress Note       12/29/23 0500   Appointment Info   Signing clinician's name / credentials True Reddy, PT, DPT   Total/Authorized Visits 12 (PT POC)   Visits Used 8   Medical Diagnosis Lumbar radicular pain   PT Tx Diagnosis Bilateral knee pain, pain into both legs, low back pain   Quick Adds Certification   Progress Note/Certification   Start of Care Date 09/29/23   Onset of illness/injury or Date of Surgery 05/29/23   Therapy Frequency every other week   Predicted Duration for 8 weeks   Certification date from 12/23/23   Certification date to 02/17/23   Progress Note Completed Date 09/29/23   PT Goal 1   Goal Identifier LTG 1   Goal Description Patient will be able to sleep throughout night without pain into either leg   Rationale to maximize safety and independence with self cares   Goal Progress improving pain at night, sometimes wakes up 1-2x/night due to knee achiness   Target Date 11/24/23   Subjective Report   Subjective Report No burning leg pain. Mostly knee stiffness and soreness that improves with activity. Feeling stronger each week.   Objective Measures   Objective Measures Objective Measure 3   Objective Measure 1   Objective Measure Functional testing   Details BIlateral squats: minimal posterior hip excursion, 50% of normal motion with notable knee crepitus and hip IR. Tolerated leg press better   Objective Measure 2   Objective Measure Knee ROM   Details flexion 132 degrees right, 128 degrees left   Objective Measure 3   Objective Measure Strength   Details R hip abd 4/5, L 4/5, L knee ext 4/5, R 4+/5,   Treatment Interventions (PT)   Interventions Manual Therapy   Therapeutic Procedure/Exercise   Therapeutic Procedures: strength, endurance, ROM, flexibillity minutes (25423) 30   Therapeutic Procedures Ther Proc 2   Ther Proc 1 Stationary bike   Ther Proc 1 - Details 5 minutes, resistance 3, SH 5   Ther Proc 2 Leg press   Ther Proc 2 - Details x30 at highest setting AG then  "2x15 with 10# at 32 degrees   PTRx Ther Proc 1 Hip Abduction Straight Leg Raise   PTRx Ther Proc 1 - Details x15 then x10 B (easily fatigued cues to avoid hip ER)   PTRx Ther Proc 2 Bridging With Theraband   PTRx Ther Proc 2 - Details green band around knees x20   PTRx Ther Proc 3 Seated Knee Extension With Theraband   PTRx Ther Proc 3 - Details x15 green band   Patient Response/Progress Tolerated strengthening progrsesions well. Will perform calf stretching more often to help reduce burning pain.   Therapeutic Activity   Therapeutic Activities: dynamic activities to improve functional performance minutes (64547) 10   Ther Act 1 Review of functional activities to improve strength (steps, squatting activites, standing up from chair). Will incorporate more throughout the day   PTRx Ther Act 1 Stepdown Backward   PTRx Ther Act 1 - Details x20 B with support 6\" step   PTRx Ther Act 2 Knee Bends Supported   PTRx Ther Act 2 - Details x10   Patient Response/Progress good technique but quite challenging   Education   Learner/Method No Barriers to Learning;Pictures/Video;Demonstration;Reading;Listening;Patient   Plan   Home program PTRX (print)   Plan for next session will start to transition to 2x/month for PT visits focusing on improving quad and glute strength   Total Session Time   Timed Code Treatment Minutes 40   Total Treatment Time (sum of timed and untimed services) 40         ASSESSMENT  Vernell Hansen has nearly accomplished goals set by PT. She will benefit from 2-4 more visits to complete strengthening program before discharging.    PLAN  2x/month for 4 visits    Beginning/End Dates of Progress Note Reporting Period:  09/29/23 to 12/29/2023    Referring Provider:  Bruce Castillo         [facility]                                                                                   OUTPATIENT PHYSICAL THERAPY    PLAN OF TREATMENT FOR OUTPATIENT REHABILITATION   Patient's Last " Name, First Name, Vernell Mary YOB: 1950   Provider's Name   [facility]   Medical Record No.  [id number]     Onset Date: 05/29/23  Start of Care Date: 09/29/23     Medical Diagnosis:  Lumbar radicular pain      PT Treatment Diagnosis:  Bilateral knee pain, pain into both legs, low back pain Plan of Treatment  Frequency/Duration: every other week/ for 8 weeks    Certification date from 12/23/23 to 02/17/23         See note for plan of treatment details and functional goals     True Reddy, PT                         I CERTIFY THE NEED FOR THESE SERVICES FURNISHED UNDER        THIS PLAN OF TREATMENT AND WHILE UNDER MY CARE     (Physician attestation of this document indicates review and certification of the therapy plan).              Referring Provider:  Bruce Castillo    Initial Assessment  See Epic Evaluation- Start of Care Date: 09/29/23

## 2024-01-12 ENCOUNTER — THERAPY VISIT (OUTPATIENT)
Dept: PHYSICAL THERAPY | Facility: CLINIC | Age: 74
End: 2024-01-12
Payer: COMMERCIAL

## 2024-01-12 DIAGNOSIS — M25.561 CHRONIC PAIN OF BOTH KNEES: Primary | ICD-10-CM

## 2024-01-12 DIAGNOSIS — M25.562 CHRONIC PAIN OF BOTH KNEES: Primary | ICD-10-CM

## 2024-01-12 DIAGNOSIS — G89.29 CHRONIC PAIN OF BOTH KNEES: Primary | ICD-10-CM

## 2024-01-12 PROCEDURE — 97530 THERAPEUTIC ACTIVITIES: CPT | Mod: GP | Performed by: PHYSICAL THERAPIST

## 2024-01-12 PROCEDURE — 97110 THERAPEUTIC EXERCISES: CPT | Mod: 59 | Performed by: PHYSICAL THERAPIST

## 2024-01-29 ENCOUNTER — THERAPY VISIT (OUTPATIENT)
Dept: PHYSICAL THERAPY | Facility: CLINIC | Age: 74
End: 2024-01-29
Payer: COMMERCIAL

## 2024-01-29 DIAGNOSIS — M25.561 CHRONIC PAIN OF BOTH KNEES: Primary | ICD-10-CM

## 2024-01-29 DIAGNOSIS — G89.29 CHRONIC PAIN OF BOTH KNEES: Primary | ICD-10-CM

## 2024-01-29 DIAGNOSIS — M25.562 CHRONIC PAIN OF BOTH KNEES: Primary | ICD-10-CM

## 2024-01-29 PROCEDURE — 97110 THERAPEUTIC EXERCISES: CPT | Mod: 59 | Performed by: PHYSICAL THERAPIST

## 2024-01-29 PROCEDURE — 97530 THERAPEUTIC ACTIVITIES: CPT | Mod: GP | Performed by: PHYSICAL THERAPIST

## 2024-01-29 NOTE — PROGRESS NOTES
Physical Therapy Progress Note       01/29/24 0500   Appointment Info   Signing clinician's name / credentials True Reddy, PT, DPT   Total/Authorized Visits 12 (PT POC)   Visits Used 10   Medical Diagnosis Lumbar radicular pain   PT Tx Diagnosis Bilateral knee pain, pain into both legs, low back pain   Quick Adds Certification   Progress Note/Certification   Start of Care Date 09/29/23   Onset of illness/injury or Date of Surgery 05/29/23   Therapy Frequency every other week   Predicted Duration for 8 weeks   Certification date from 12/23/23   Certification date to 02/17/23   Progress Note Completed Date 09/29/23   PT Goal 1   Goal Identifier LTG 1   Goal Description Patient will be able to sleep throughout night without pain into either leg   Rationale to maximize safety and independence with self cares   Goal Progress goal met   Target Date 02/17/23   Date Met 01/29/24   Subjective Report   Subjective Report Doing well with pilates and general exercise.  Active daily without much for knee pain. Knows her limits and what to avoid.   Objective Measures   Objective Measures Objective Measure 3   Objective Measure 1   Objective Measure Functional testing   Details BIlateral squats: minimal posterior hip excursion, 50% of normal motion with notable knee crepitus and hip IR. Tolerated leg press better   Objective Measure 2   Objective Measure Knee ROM   Details WFL B (right knee slightly less painful)   Objective Measure 3   Objective Measure Strength   Details R hip abd 4/5, L 4/5, L knee ext 4/5, R 4+/5, L knee flex 4/5, R 4+/5   Treatment Interventions (PT)   Interventions Manual Therapy   Therapeutic Procedure/Exercise   Therapeutic Procedures: strength, endurance, ROM, flexibillity minutes (10165) 28   Therapeutic Procedures Ther Proc 2   Ther Proc 1 Stationary bike   Ther Proc 1 - Details 6 minutes, resistance 2-3, SH 5   PTRx Ther Proc 1 Hip Abduction Straight Leg Raise   PTRx Ther Proc 1 - Details x20 B  "(easily fatigued cues to avoid hip ER)   PTRx Ther Proc 2 Bridging With Theraband   PTRx Ther Proc 2 - Details green band around knees x20   PTRx Ther Proc 3 Seated Knee Extension With Theraband   PTRx Ther Proc 3 - Details x15 green band then x15 with 3# ankle weight B   PTRx Ther Proc 4 Donkey kick   PTRx Ther Proc 4 - Details x20 B (will try ankle weights at home)   PTRx Ther Proc 5 LAQ   PTRx Ther Proc 5 - Details x15 with 3# ankle weights   Patient Response/Progress Will continue HEP at home as prescribed 3-4x/week. Good techniques throughout with modifications   PTRx Ther Proc 6 Wall sit   PTRx Ther Proc 6 - Details not well tolerated even at minimal knee flexion (not part of HEP)   Therapeutic Activity   Therapeutic Activities: dynamic activities to improve functional performance minutes (61508) 12   Ther Act 1 Reverse step down   Ther Act 1 - Details 6\" step 2x10 B; extra time for cues   Ther Act 2 Lateral step down   Ther Act 2 - Details 2\" x20 (extra time for cues; not part of HEP)   Patient Response/Progress Improved technique from last week with better body mechanics.   Ther Act 3 Lunge   Ther Act 3 - Details not well tolerated unless supported (not part of HEP but improve body mechanics for when she drops something on floor at home); also reviewed hip hinge to avoid over bending knees   Therapeutic Activities Ther Act 3   Education   Learner/Method No Barriers to Learning;Pictures/Video;Demonstration;Reading;Listening;Patient   Plan   Home program PTRX (print)   Plan for next session Toleratin HEP well and will self-progress as able. Ready to discharge PT as goals are met   Total Session Time   Timed Code Treatment Minutes 40   Total Treatment Time (sum of timed and untimed services) 40         ASSESSMENT  Vernell Hansen has met goals for physical therapy. She will continue HEP to strengthen knees to protect joint surfaces. She is scheduled for another injection prior to a vacation at the end of the " month    PLAN  Discharge physical therapy and continue home exercise program    Beginning/End Dates of Progress Note Reporting Period:  09/29/23 to 01/29/2024    Referring Provider:  Bruce Castillo

## 2024-02-15 ENCOUNTER — TELEPHONE (OUTPATIENT)
Dept: ORTHOPEDICS | Facility: CLINIC | Age: 74
End: 2024-02-15
Payer: COMMERCIAL

## 2024-02-15 NOTE — TELEPHONE ENCOUNTER
2/15 Called and left voicemail, provided phone number [phone] to reschedule appointment with Dr. Castillo.     .[facility]   Orthopedics, Podiatry, Sports Medicine, Ent ,Eye , Audiology, Adult Endocrine & Diabetes, Nutrition & Medication Therapy Management Specialties   [facility]

## 2024-02-15 NOTE — TELEPHONE ENCOUNTER
Patient Returning Call    Reason for call:  pt is needing to reschedule apt but wanting it to be same week, linda cruz is her sister and she is coming in for an injection, wanting to know if she can come in at the same time requesting callback     Information relayed to patient:  te sent to clinic     Patient has additional questions:  No      Okay to leave a detailed message?: Yes at Cell number on file:    Telephone Information:   Mobile [phone]

## 2024-02-15 NOTE — TELEPHONE ENCOUNTER
Pt returning call from nurse line. Per pt, she's unable to make the appt on Monday due to a  but she's looking to see if Jonathan can squeeze her in any day next week as she's leaving for out of town on 24. Please review his scheduled and see if we can fit her in. She would really like to get this injection before she leaves. Please call pt back

## 2024-02-15 NOTE — TELEPHONE ENCOUNTER
[facility] Call Center    Phone Message    May a detailed message be left on voicemail: yes     Reason for Call: Other: Patient is calling back because this no longer works for the patient and she is wondering if there is any other time next week she can see Dr Castillo. Patient is being requested to help with a  during her scheduled appointment time on . Please call patient.      Action Taken: Other: [id number]    Travel Screening: Not Applicable

## 2024-02-16 NOTE — TELEPHONE ENCOUNTER
[facility] Call Center    Phone Message    May a detailed message be left on voicemail: yes     Reason for Call: Other: Patient is still waiting for a call back, wondering if she can get squeezed in. OK to be squeezed in later in the day of 02/19.     Action Taken: Message routed to:  [facility]  Orthopedics.    Travel Screening: Not Applicable

## 2024-02-21 NOTE — PROGRESS NOTES
HISTORY OF PRESENT ILLNESS  Ms. Hansen is a pleasant 73 year old year old female who presents to clinic today with the following:  What problem are you here for? Discuss cortisone injections for the knees  She has a trip coming up and wants to get her knees injected  She is also having some more pain in low back that radiates into legs  How long have you had this problem? Chronic    Have you had any recent imaging of this problem? Xrays/MRI/CT scans? XR 3/3/23    Have you had treatments for this problem in the past?  -Medications? Euflexxa   -Physical therapy? Yes  -Injections? Euflexxa  -Surgery? None    How severe is this problem today? 0-10 scale? 3    How severe has this problem been at WORST in the past? 0-10 scale? 8    What do you think caused this problem? No injury, gradually worsening    Does this problem or its symptoms cause difficulty for you falling asleep or staying asleep? Yes, difficulty getting comfortable    Anything else you want us to know about this problem? Left knee worse than right, good days and bad days          MEDICAL HISTORY  Patient Active Problem List   Diagnosis    Bilateral knee pain    Lumbar radicular pain    Chronic pain of both knees       Current Outpatient Medications   Medication Sig Dispense Refill    calcium carbonate (TUMS) 500 MG chewable tablet Take 1 chew tab by mouth 2 times daily      glucosamine 500 MG CAPS capsule Take 500 mg by mouth daily      lisinopril (ZESTRIL) 20 MG tablet Take 20 mg by mouth daily      LORazepam (ATIVAN) 1 MG tablet Take 1 mg by mouth every 6 hours as needed for anxiety         No Known Allergies    No family history on file.  Social History     Socioeconomic History    Marital status:        Additional medical/Social/Surgical histories reviewed in [facility] and updated as appropriate.     REVIEW OF SYSTEMS (2/21/2024)  10 point ROS of systems including Constitutional, Eyes, Respiratory, Cardiovascular, Gastroenterology, Genitourinary,  Integumentary, Musculoskeletal, Psychiatric, Allergic/Immunologic were all negative except for pertinent positives noted in my HPI.     PHYSICAL EXAM  VSS  Vital Signs: There were no vitals taken for this visit. Patient declined being weighed. There is no height or weight on file to calculate BMI.    General  - normal appearance, in no obvious distress  HEENT  - conjunctivae not injected, moist mucous membranes, normocephalic/atraumatic head, ears normal appearance, no lesions, mouth normal appearance, no scars, normal dentition and teeth present  CV  - normal popliteal pulse  Pulm  - normal respiratory pattern, non-labored  Musculoskeletal - bilateral knee  - stance: mildly antalgic gait, genu varum  - inspection: some generalized swelling, trace effusion  - palpation: medial joint line tenderness, patella and patellar tendon non-tender, normal popliteal pulse  - ROM: 100 degrees flexion, 0 degrees extension, painful active ROM  - strength: 5/5 in flexion, 5/5 in extension  - neuro: no sensory or motor deficit  - special tests:  (-) Lachman  (-) anterior drawer  (-) posterior drawer  (-) pivot shift  (-) Jarvis  (-) Thessaly  (-) varus at 0 and 30 degrees flexion  (-) valgus at 0 and 30 degrees flexion  (+) Deion s compression test  (-) patellar apprehension  Neuro  - no sensory or motor deficit, grossly normal coordination, normal muscle tone  Skin  - no ecchymosis, erythema, warmth, or induration, no obvious rash  Psych  - interactive, appropriate, normal mood and affect       ASSESSMENT & PLAN  74 yo female with bilateral knee arthritis, worse, and lumbar ddd, disc herniations radicular pain    I independently reviewed the following imaging studies:  Lumbar xray: shows ddd  Bilateral knee xray: shows arthritis  After a 20 minute discussion and examination, we decided to perform a same day injection for diagnostic and therapeutic purposes for  Bilateral knees  Followup in 1-2 months and consider lumbar MRI  Rx  given for flexeril  Patient has been doing home exercise physical therapy program for this problem    PROCEDURE:      bilateral      Knee joint injection   The patient was apprised of the risks and the benefits of the procedure and consented and a written consent was signed.   The patient s  KNEEs were sterilely prepped with chloraprep.   40 mg of triamcinolone suspension was drawn up into a 5 mL syringe with 4 mL of 1% lidocaine for each knee  The patient was injected into the R and L knee with a 1.5-inch 22-gauge needle at the_superiorlateral aspect of their knee joints  There were no complications. The patient tolerated the procedure well. There was minimal bleeding.   The patient was instructed to ice the knees upon leaving clinic and refrain from overuse over the next 3 days.   The patient was instructed to go to the emergency room with any usual pain, swelling, or redness occurred in the injected area.   The patient was given a followup appointment to evaluate response to the injection to their increased range of motion and reduction of pain.  Follow up PRN  Dr Bruce Castillo     Large Joint Injection: bilateral knee    Date/Time: 2/22/2024 8:50 AM    Performed by: Bruce Castillo MD  Authorized by: Bruce Castillo MD    Indications:  Pain and osteoarthritis  Needle Size:  22 G  Guidance: landmark guided    Approach:  Superolateral  Location:  Knee  Laterality:  Bilateral      Medications (Right):  40 mg triamcinolone 40 MG/ML; 4 mL lidocaine (PF) 1 %  Medications (Left):  40 mg triamcinolone 40 MG/ML; 4 mL lidocaine (PF) 1 %  Outcome:  Tolerated well, no immediate complications  Procedure discussed: discussed risks, benefits, and alternatives    Consent Given by:  Patient  Timeout: timeout called immediately prior to procedure    Prep: patient was prepped and draped in usual sterile fashion          Appropriate PPE was utilized for prevention of spread of Covid-19.  Bruce Castillo MD, [facility]

## 2024-02-22 ENCOUNTER — TELEPHONE (OUTPATIENT)
Dept: ORTHOPEDICS | Facility: CLINIC | Age: 74
End: 2024-02-22

## 2024-02-22 ENCOUNTER — OFFICE VISIT (OUTPATIENT)
Dept: ORTHOPEDICS | Facility: CLINIC | Age: 74
End: 2024-02-22
Payer: COMMERCIAL

## 2024-02-22 DIAGNOSIS — M17.0 PRIMARY OSTEOARTHRITIS OF BOTH KNEES: Primary | ICD-10-CM

## 2024-02-22 DIAGNOSIS — M54.16 LUMBAR RADICULAR PAIN: ICD-10-CM

## 2024-02-22 PROCEDURE — 99214 OFFICE O/P EST MOD 30 MIN: CPT | Mod: 25 | Performed by: PREVENTIVE MEDICINE

## 2024-02-22 PROCEDURE — 20610 DRAIN/INJ JOINT/BURSA W/O US: CPT | Mod: 50 | Performed by: PREVENTIVE MEDICINE

## 2024-02-22 RX ORDER — TRIAMCINOLONE ACETONIDE 40 MG/ML
40 INJECTION, SUSPENSION INTRA-ARTICULAR; INTRAMUSCULAR
Status: SHIPPED | OUTPATIENT
Start: 2024-02-22

## 2024-02-22 RX ORDER — LIDOCAINE HYDROCHLORIDE 10 MG/ML
4 INJECTION, SOLUTION EPIDURAL; INFILTRATION; INTRACAUDAL; PERINEURAL
Status: SHIPPED | OUTPATIENT
Start: 2024-02-22

## 2024-02-22 RX ORDER — CYCLOBENZAPRINE HCL 10 MG
10 TABLET ORAL
Qty: 30 TABLET | Refills: 0 | Status: SHIPPED | OUTPATIENT
Start: 2024-02-22 | End: 2024-03-06

## 2024-02-22 RX ADMIN — TRIAMCINOLONE ACETONIDE 40 MG: 40 INJECTION, SUSPENSION INTRA-ARTICULAR; INTRAMUSCULAR at 08:50

## 2024-02-22 RX ADMIN — LIDOCAINE HYDROCHLORIDE 4 ML: 10 INJECTION, SOLUTION EPIDURAL; INFILTRATION; INTRACAUDAL; PERINEURAL at 08:50

## 2024-02-22 NOTE — NURSING NOTE
[facility] [address] [address]  Dept: [phone]  ______________________________________________________________________________    Patient: Vernell Hansen   : 1950   MRN: [id number]   2024    INVASIVE PROCEDURE SAFETY CHECKLIST    Date: 24   Procedure: Bilateral knee kenalog injections  Patient Name: Vernell Hansen  MRN: [id number]  YOB: 1950    Action: Complete sections as appropriate. Any discrepancy results in a HARD COPY until resolved.     PRE PROCEDURE:  Patient ID verified with 2 identifiers (name and  or MRN): Yes  Procedure and site verified with patient/designee (when able): Yes  Accurate consent documentation in medical record: Yes  H&P (or appropriate assessment) documented in medical record: Yes  H&P must be up to 20 days prior to procedure and updates within 24 hours of procedure as applicable: NA  Relevant diagnostic and radiology test results appropriately labeled and displayed as applicable: Yes  Procedure site(s) marked with provider initials: NA    TIMEOUT:  Time-Out performed immediately prior to starting procedure, including verbal and active participation of all team members addressing the following:Yes  * Correct patient identify  * Confirmed that the correct side and site are marked  * An accurate procedure consent form  * Agreement on the procedure to be done  * Correct patient position  * Relevant images and results are properly labeled and appropriately displayed  * The need to administer antibiotics or fluids for irrigation purposes during the procedure as applicable   * Safety precautions based on patient history or medication use    DURING PROCEDURE: Verification of correct person, site, and procedures any time the responsibility for care of the patient is transferred to another member of the care team.       Prior to injection, verified patient identity using patient's name and date of  birth.  Due to injection administration, patient instructed to remain in clinic for 15 minutes  afterwards, and to report any adverse reaction to me immediately.    Joint injection was performed.      Drug Amount Wasted:  None.  Vial/Syringe: Single dose vial  Expiration Date:  11/2025      Cuca Packer, BACILIO  February 22, 2024

## 2024-02-22 NOTE — TELEPHONE ENCOUNTER
[facility] Call Center    Phone Message    May a detailed message be left on voicemail: no     Reason for Call: Per patient pharmacy hasn't received the prescription that Dr Castillo sent. Please c/b after resent

## 2024-02-22 NOTE — LETTER
2/22/2024         RE: Vernell Hansen  [address]  [facility] [address]        Dear Colleague,    Thank you for referring your patient, Vernell Hansen, to the [facility] [place]. Please see a copy of my visit note below.    HISTORY OF PRESENT ILLNESS  Ms. Hansen is a pleasant 73 year old year old female who presents to clinic today with the following:  What problem are you here for? Discuss cortisone injections for the knees  She has a trip coming up and wants to get her knees injected  She is also having some more pain in low back that radiates into legs  How long have you had this problem? Chronic    Have you had any recent imaging of this problem? Xrays/MRI/CT scans? XR 3/3/23    Have you had treatments for this problem in the past?  -Medications? Euflexxa   -Physical therapy? Yes  -Injections? Euflexxa  -Surgery? None    How severe is this problem today? 0-10 scale? 3    How severe has this problem been at WORST in the past? 0-10 scale? 8    What do you think caused this problem? No injury, gradually worsening    Does this problem or its symptoms cause difficulty for you falling asleep or staying asleep? Yes, difficulty getting comfortable    Anything else you want us to know about this problem? Left knee worse than right, good days and bad days          MEDICAL HISTORY  Patient Active Problem List   Diagnosis     Bilateral knee pain     Lumbar radicular pain     Chronic pain of both knees       Current Outpatient Medications   Medication Sig Dispense Refill     calcium carbonate (TUMS) 500 MG chewable tablet Take 1 chew tab by mouth 2 times daily       glucosamine 500 MG CAPS capsule Take 500 mg by mouth daily       lisinopril (ZESTRIL) 20 MG tablet Take 20 mg by mouth daily       LORazepam (ATIVAN) 1 MG tablet Take 1 mg by mouth every 6 hours as needed for anxiety         No Known Allergies    No family history on file.  Social History     Socioeconomic History      Marital status:        Additional medical/Social/Surgical histories reviewed in [facility] and updated as appropriate.     REVIEW OF SYSTEMS (2/21/2024)  10 point ROS of systems including Constitutional, Eyes, Respiratory, Cardiovascular, Gastroenterology, Genitourinary, Integumentary, Musculoskeletal, Psychiatric, Allergic/Immunologic were all negative except for pertinent positives noted in my HPI.     PHYSICAL EXAM  VSS  Vital Signs: There were no vitals taken for this visit. Patient declined being weighed. There is no height or weight on file to calculate BMI.    General  - normal appearance, in no obvious distress  HEENT  - conjunctivae not injected, moist mucous membranes, normocephalic/atraumatic head, ears normal appearance, no lesions, mouth normal appearance, no scars, normal dentition and teeth present  CV  - normal popliteal pulse  Pulm  - normal respiratory pattern, non-labored  Musculoskeletal - bilateral knee  - stance: mildly antalgic gait, genu varum  - inspection: some generalized swelling, trace effusion  - palpation: medial joint line tenderness, patella and patellar tendon non-tender, normal popliteal pulse  - ROM: 100 degrees flexion, 0 degrees extension, painful active ROM  - strength: 5/5 in flexion, 5/5 in extension  - neuro: no sensory or motor deficit  - special tests:  (-) Lachman  (-) anterior drawer  (-) posterior drawer  (-) pivot shift  (-) Jarvis  (-) Thessaly  (-) varus at 0 and 30 degrees flexion  (-) valgus at 0 and 30 degrees flexion  (+) Deion s compression test  (-) patellar apprehension  Neuro  - no sensory or motor deficit, grossly normal coordination, normal muscle tone  Skin  - no ecchymosis, erythema, warmth, or induration, no obvious rash  Psych  - interactive, appropriate, normal mood and affect       ASSESSMENT & PLAN  72 yo female with bilateral knee arthritis, worse, and lumbar ddd, disc herniations radicular pain    I independently reviewed the following imaging  studies:  Lumbar xray: shows ddd  Bilateral knee xray: shows arthritis  After a 20 minute discussion and examination, we decided to perform a same day injection for diagnostic and therapeutic purposes for  Bilateral knees  Followup in 1-2 months and consider lumbar MRI  Rx given for flexeril  Patient has been doing home exercise physical therapy program for this problem    PROCEDURE:      bilateral      Knee joint injection   The patient was apprised of the risks and the benefits of the procedure and consented and a written consent was signed.   The patient s  KNEEs were sterilely prepped with chloraprep.   40 mg of triamcinolone suspension was drawn up into a 5 mL syringe with 4 mL of 1% lidocaine for each knee  The patient was injected into the R and L knee with a 1.5-inch 22-gauge needle at the_superiorlateral aspect of their knee joints  There were no complications. The patient tolerated the procedure well. There was minimal bleeding.   The patient was instructed to ice the knees upon leaving clinic and refrain from overuse over the next 3 days.   The patient was instructed to go to the emergency room with any usual pain, swelling, or redness occurred in the injected area.   The patient was given a followup appointment to evaluate response to the injection to their increased range of motion and reduction of pain.  Follow up PRN  Dr Bruce Castillo     Large Joint Injection: bilateral knee    Date/Time: 2/22/2024 8:50 AM    Performed by: Bruce Castillo MD  Authorized by: Bruce Castillo MD    Indications:  Pain and osteoarthritis  Needle Size:  22 G  Guidance: landmark guided    Approach:  Superolateral  Location:  Knee  Laterality:  Bilateral      Medications (Right):  40 mg triamcinolone 40 MG/ML; 4 mL lidocaine (PF) 1 %  Medications (Left):  40 mg triamcinolone 40 MG/ML; 4 mL lidocaine (PF) 1 %  Outcome:  Tolerated well, no immediate complications  Procedure discussed: discussed risks, benefits,  and alternatives    Consent Given by:  Patient  Timeout: timeout called immediately prior to procedure    Prep: patient was prepped and draped in usual sterile fashion          Appropriate PPE was utilized for prevention of spread of Covid-19.  Bruce Castillo MD, CAM         Again, thank you for allowing me to participate in the care of your patient.        Sincerely,        Bruce Castillo MD

## 2024-02-23 ENCOUNTER — TELEPHONE (OUTPATIENT)
Dept: ORTHOPEDICS | Facility: CLINIC | Age: 74
End: 2024-02-23
Payer: COMMERCIAL

## 2024-02-23 NOTE — TELEPHONE ENCOUNTER
[facility] Call Center    Phone Message    May a detailed message be left on voicemail: yes     Reason for Call: Other: patient calling as the Rx did not go to the pharmacy. She is leaving the country tomorrow and needs this filled today.    cyclobenzaprine (FLEXERIL) 10 MG tablet    [facility] - [place], [place] - [address]    Action Taken: Other: TE     Travel Screening: Not Applicable

## 2024-03-04 DIAGNOSIS — M17.0 PRIMARY OSTEOARTHRITIS OF BOTH KNEES: ICD-10-CM

## 2024-03-04 DIAGNOSIS — M54.16 LUMBAR RADICULAR PAIN: ICD-10-CM

## 2024-03-04 NOTE — TELEPHONE ENCOUNTER
[facility] Call Center    Phone Message    May a detailed message be left on voicemail: yes     Reason for Call: Other: Vernell Elena is calling because she having issues with getting her flexral and would like to have someone call the [facility] pharmacy because they are currently not receiving electronic submissions. Please call her     Action Taken: Other: [facility]    Travel Screening: Not Applicable

## 2024-03-06 RX ORDER — CYCLOBENZAPRINE HCL 10 MG
10 TABLET ORAL
Qty: 30 TABLET | Refills: 0 | Status: SHIPPED | OUTPATIENT
Start: 2024-03-06

## 2024-03-06 NOTE — TELEPHONE ENCOUNTER
Prescription refill requested for:   cyclobenzaprine (FLEXERIL) 10 MG tablet       Last Written Prescription Date:  2/22/24   Last Fill Quantity: 30,   # refills: 0  Last Office Visit: 2/22/24  Future Office visit:       This med was never received by pharmacy, this is a reorder due to E-Scribe error.     Nader Schaefer, ATC

## 2024-03-07 ENCOUNTER — VIRTUAL VISIT (OUTPATIENT)
Dept: ORTHOPEDICS | Facility: CLINIC | Age: 74
End: 2024-03-07
Payer: COMMERCIAL

## 2024-03-07 ENCOUNTER — TELEPHONE (OUTPATIENT)
Dept: ORTHOPEDICS | Facility: CLINIC | Age: 74
End: 2024-03-07

## 2024-03-07 DIAGNOSIS — M17.0 PRIMARY OSTEOARTHRITIS OF BOTH KNEES: Primary | ICD-10-CM

## 2024-03-07 DIAGNOSIS — M54.16 LUMBAR RADICULAR PAIN: ICD-10-CM

## 2024-03-07 PROCEDURE — 99213 OFFICE O/P EST LOW 20 MIN: CPT | Mod: 93 | Performed by: PREVENTIVE MEDICINE

## 2024-03-07 NOTE — LETTER
3/7/2024         RE: Vernell Hansen  [address]  [facility] [address]        Dear Colleague,    Thank you for referring your patient, Vernell Hansen, to the [facility] [place]. Please see a copy of my visit note below.    Patient is a   73  year old who is being evaluated via a billable telephone visit.      What phone number would you like to be contacted at? CELL  How would you like to obtain your AVS? MYCHART        Subjective   Patient is a   73  year old who presents by phone call visit for the following:     HPI   Followup for knees   And lumbar radiculopathy  Overall improved  Has had over 75% improvement from euflexxa injections             Review of Systems   Constitutional, HEENT, cardiovascular, pulmonary, gi and gu systems are negative, except as otherwise noted.      Objective           Vitals:  No vitals were obtained today due to virtual visit.    Physical Exam   healthy, alert, and no distress  PSYCH: Alert and oriented times 3; coherent speech, normal   rate and volume, able to articulate logical thoughts, able   to abstract reason, no tangential thoughts, no hallucinations   or delusions  His affect is normal  RESP: No cough, no audible wheezing, able to talk in full sentences  Remainder of exam unable to be completed due to telephone visits    Assessment/Plan  72 yo female with lumbar radiculopathy and bilateral knee arthritis, overall        I independently reviewed the following imaging studies and discussed with patient:  Bilateral knee xrays: shows arthritis  Cont. HEP   Followup for euflexxa in June  Consider lumbar MRI if radiculopathy symptoms persist          Phone call duration: 20 minutes  Phone call start: 905am  Phone call end: 925am  Dr Castillo      Again, thank you for allowing me to participate in the care of your patient.        Sincerely,        Bruce Castillo MD

## 2024-03-07 NOTE — LETTER
3/7/2024         RE: Vernell Hansen  [address]  [facility] [address]        Dear Colleague,    Thank you for referring your patient, Vernell Hansen, to the [facility] [place]. Please see a copy of my visit note below.    Patient is a   73  year old who is being evaluated via a billable telephone visit.      What phone number would you like to be contacted at? CELL  How would you like to obtain your AVS? MYCHART        Subjective   Patient is a   73  year old who presents by phone call visit for the following:     HPI   Followup for knees   And lumbar radiculopathy  Overall improved  Has had over 75% improvement from euflexxa injections             Review of Systems   Constitutional, HEENT, cardiovascular, pulmonary, gi and gu systems are negative, except as otherwise noted.      Objective           Vitals:  No vitals were obtained today due to virtual visit.    Physical Exam   healthy, alert, and no distress  PSYCH: Alert and oriented times 3; coherent speech, normal   rate and volume, able to articulate logical thoughts, able   to abstract reason, no tangential thoughts, no hallucinations   or delusions  His affect is normal  RESP: No cough, no audible wheezing, able to talk in full sentences  Remainder of exam unable to be completed due to telephone visits    Assessment/Plan  74 yo female with lumbar radiculopathy and bilateral knee arthritis, overall        I independently reviewed the following imaging studies and discussed with patient:  Bilateral knee xrays: shows arthritis  Cont. HEP   Followup for euflexxa in June  Consider lumbar MRI if radiculopathy symptoms persist          Phone call duration: 20 minutes  Phone call start: 905am  Phone call end: 925am  Dr Castillo      Again, thank you for allowing me to participate in the care of your patient.        Sincerely,        Bruce Castillo MD

## 2024-03-07 NOTE — PROGRESS NOTES
Patient is a   73  year old who is being evaluated via a billable telephone visit.      What phone number would you like to be contacted at? CELL  How would you like to obtain your AVS? MALIKA        Subjective   Patient is a   73  year old who presents by phone call visit for the following:     HPI   Followup for knees   And lumbar radiculopathy  Overall improved  Has had over 75% improvement from euflexxa injections             Review of Systems   Constitutional, HEENT, cardiovascular, pulmonary, gi and gu systems are negative, except as otherwise noted.      Objective           Vitals:  No vitals were obtained today due to virtual visit.    Physical Exam   healthy, alert, and no distress  PSYCH: Alert and oriented times 3; coherent speech, normal   rate and volume, able to articulate logical thoughts, able   to abstract reason, no tangential thoughts, no hallucinations   or delusions  His affect is normal  RESP: No cough, no audible wheezing, able to talk in full sentences  Remainder of exam unable to be completed due to telephone visits    Assessment/Plan  72 yo female with lumbar radiculopathy and bilateral knee arthritis, overall        I independently reviewed the following imaging studies and discussed with patient:  Bilateral knee xrays: shows arthritis  Cont. HEP   Followup for euflexxa in June  Consider lumbar MRI if radiculopathy symptoms persist          Phone call duration: 20 minutes  Phone call start: 905am  Phone call end: 925am  Dr Castillo

## 2024-03-07 NOTE — TELEPHONE ENCOUNTER
3/7 Called and left voicemail, provided phone number [phone] to schedule bilateral knee euflexxa injections after 6/4/2024      This is a series of three injections at least one week apart.       [facility]   Orthopedics, Podiatry, Sports Medicine, Ent ,Eye , Audiology, Adult Endocrine & Diabetes, Nutrition & Medication Therapy Management Specialties   [facility]          ----- Message from Lilliana Whitley ATC sent at 3/7/2024  9:32 AM [facility] -----  Regarding: Euflexxa Appointments  Good Morning!   When you have a moment, can you reach out to this patient and help schedule her three appointments for bilateral knee Euflexxa injections for a date after 6/4/2024.    Thank you!  BACILIO Tijerina

## 2024-03-13 NOTE — TELEPHONE ENCOUNTER
3/13 2nd attempt.  Called and left voicemail, provided phone number [phone] to schedule bilateral knee euflexxa injections after 6/4/2024        This is a series of three injections at least one week apart.         [facility]   Orthopedics, Podiatry, Sports Medicine, Ent ,Eye , Audiology, Adult Endocrine & Diabetes, Nutrition & Medication Therapy Management Specialties   [facility]

## 2024-06-05 ENCOUNTER — OFFICE VISIT (OUTPATIENT)
Dept: ORTHOPEDICS | Facility: CLINIC | Age: 74
End: 2024-06-05
Payer: COMMERCIAL

## 2024-06-05 DIAGNOSIS — M17.0 PRIMARY OSTEOARTHRITIS OF BOTH KNEES: Primary | ICD-10-CM

## 2024-06-05 PROCEDURE — 20610 DRAIN/INJ JOINT/BURSA W/O US: CPT | Mod: 50 | Performed by: PREVENTIVE MEDICINE

## 2024-06-05 PROCEDURE — 99207 PR DROP WITH A PROCEDURE: CPT | Performed by: PREVENTIVE MEDICINE

## 2024-06-05 RX ORDER — LIDOCAINE HYDROCHLORIDE 10 MG/ML
3 INJECTION, SOLUTION INFILTRATION; PERINEURAL
Status: SHIPPED | OUTPATIENT
Start: 2024-06-05

## 2024-06-05 RX ORDER — HYALURONATE SODIUM 10 MG/ML
20 SYRINGE (ML) INTRAARTICULAR
Status: SHIPPED | OUTPATIENT
Start: 2024-06-05

## 2024-06-05 RX ADMIN — Medication 20 MG: at 11:45

## 2024-06-05 RX ADMIN — LIDOCAINE HYDROCHLORIDE 3 ML: 10 INJECTION, SOLUTION INFILTRATION; PERINEURAL at 11:45

## 2024-06-05 NOTE — LETTER
6/5/2024      Vernell Hansen  [address]  [place] [facility] [address]      Dear Colleague,    Thank you for referring your patient, Vernell Hansen, to the [facility] [place]. Please see a copy of my visit note below.    HISTORY OF PRESENT ILLNESS  Ms. Hansen is a pleasant 73 year old year old female who presents to clinic today with the following:  What problem are you here for: Bilateral knee pain/OA. 1st dose of Euflexxa for bilateral knees.      How long have you had this problem: Chronic     Have you had any recent imaging of this problem? Xrays/MRI/CT scans:  - XR of bilateral knees completed on 3/3/2023    Have you had treatments for this problem in the past?  -Medications: OTC pain medication prn.  -Physical therapy: HEP   -Injections:  Bilateral Knee CSI 2/22/2024: she reports this injection provided at least 3 months of at least 80% decreased symptoms. She was able to complete her every day living activities with less pain and did not require OTC pain medication.  Bilateral knee Euflexxa 12/14/2023: she reports these injections provided at least 4 months of at least 50% decreased symptoms. She states the combination of the HA injections and steroid injections, allows her to extend the quality of her knees.     MEDICAL HISTORY  Patient Active Problem List   Diagnosis     Bilateral knee pain     Lumbar radicular pain     Chronic pain of both knees       Current Outpatient Medications   Medication Sig Dispense Refill     cyclobenzaprine (FLEXERIL) 10 MG tablet Take 1 tablet (10 mg) by mouth nightly as needed for muscle spasms 30 tablet 0     glucosamine 500 MG CAPS capsule Take 500 mg by mouth daily       lisinopril (ZESTRIL) 20 MG tablet Take 20 mg by mouth daily       LORazepam (ATIVAN) 1 MG tablet Take 1 mg by mouth every 6 hours as needed for anxiety       calcium carbonate (TUMS) 500 MG chewable tablet Take 1 chew tab by mouth 2 times daily         No Known  Allergies    No family history on file.  Social History     Socioeconomic History     Marital status:        Additional medical/Social/Surgical histories reviewed in [facility] and updated as appropriate.     REVIEW OF SYSTEMS (6/5/2024)  10 point ROS of systems including Constitutional, Eyes, Respiratory, Cardiovascular, Gastroenterology, Genitourinary, Integumentary, Musculoskeletal, Psychiatric, Allergic/Immunologic were all negative except for pertinent positives noted in my HPI.     PHYSICAL EXAM  VSS    General  - normal appearance, in no obvious distress  HEENT  - conjunctivae not injected, moist mucous membranes, normocephalic/atraumatic head, ears normal appearance, no lesions, mouth normal appearance, no scars, normal dentition and teeth present  CV  - normal popliteal pulse  Pulm  - normal respiratory pattern, non-labored  Musculoskeletal - bilateral knee  - stance: mildly antalgic gait, genu varum  - inspection: some generalized swelling, trace effusion  - palpation: medial joint line tenderness, patella and patellar tendon non-tender, normal popliteal pulse  - ROM: 100 degrees flexion, 0 degrees extension, painful active ROM  - strength: 5/5 in flexion, 5/5 in extension  - neuro: no sensory or motor deficit  - special tests:  (-) Lachman  (-) anterior drawer  (-) posterior drawer  (-) pivot shift  (-) Jarvis  (-) Thessaly  (-) varus at 0 and 30 degrees flexion  (-) valgus at 0 and 30 degrees flexion  (+) Deion s compression test  (-) patellar apprehension  Neuro  - no sensory or motor deficit, grossly normal coordination, normal muscle tone  Skin  - no ecchymosis, erythema, warmth, or induration, no obvious rash  Psych  - interactive, appropriate, normal mood and affect     ASSESSMENT & PLAN  72 yo female with bilateral knee arthritis, not resolved    I independently reviewed the following imaging studies:  Bilateral knee xray: shows arthritis  After a 20 minute discussion and examination, we  decided to perform a same day injection for  therapeutic purposes for  Bilateral knees with euflexxa #1  Followup next week for euflexxa  Patient has been doing home exercise physical therapy program for this problem      Appropriate PPE was utilized for prevention of spread of Covid-19.  Bruce Castillo MD, [facility]    PROCEDURE:           bilateral  Knee joint injection with euflexxa #1    The patient was apprised of the risks and the benefits of the procedure and consented and a written consent was signed.   The patient s  KNEEs were sterilely prepped with chloraprep.     The patient was injected with euflexxa syringe into the right and left knee with a 1.5-inch 22-gauge needle at the_superiorlateral aspect of their knee joint    There were no complications. The patient tolerated the procedure well. There was minimal bleeding.   The patient was instructed to ice the knees upon leaving clinic and refrain from overuse over the next 3 days.   The patient was instructed to go to the emergency room with any usual pain, swelling, or redness occurred in the injected area.   The patient was given a followup appointment to evaluate response to the injection to their increased range of motion and reduction of pain.  Follow up for euflexxa  Dr Bruce Castillo     Large Joint Injection: bilateral knee    Date/Time: 6/5/2024 11:45 AM    Performed by: Bruce Castillo MD  Authorized by: Bruce Castillo MD    Indications:  Pain and osteoarthritis  Needle Size:  22 G  Guidance: landmark guided    Approach:  Superolateral  Location:  Knee  Laterality:  Bilateral      Medications (Right):  20 mg sodium hyaluronate 20 MG/2ML; 3 mL lidocaine 1 %  Medications (Left):  20 mg sodium hyaluronate 20 MG/2ML; 3 mL lidocaine 1 %  Outcome:  Tolerated well, no immediate complications  Procedure discussed: discussed risks, benefits, and alternatives    Consent Given by:  Patient  Timeout: timeout called immediately prior to procedure     Prep: patient was prepped and draped in usual sterile fashion          Again, thank you for allowing me to participate in the care of your patient.        Sincerely,        Bruce Castillo MD

## 2024-06-05 NOTE — PROGRESS NOTES
HISTORY OF PRESENT ILLNESS  Ms. Hansen is a pleasant 73 year old year old female who presents to clinic today with the following:  What problem are you here for: Bilateral knee pain/OA. 1st dose of Euflexxa for bilateral knees.      How long have you had this problem: Chronic     Have you had any recent imaging of this problem? Xrays/MRI/CT scans:  - XR of bilateral knees completed on 3/3/2023    Have you had treatments for this problem in the past?  -Medications: OTC pain medication prn.  -Physical therapy: [facility]   -Injections:  Bilateral Knee CSI 2/22/2024: she reports this injection provided at least 3 months of at least 80% decreased symptoms. She was able to complete her every day living activities with less pain and did not require OTC pain medication.  Bilateral knee Euflexxa 12/14/2023: she reports these injections provided at least 4 months of at least 50% decreased symptoms. She states the combination of the HA injections and steroid injections, allows her to extend the quality of her knees.     MEDICAL HISTORY  Patient Active Problem List   Diagnosis    Bilateral knee pain    Lumbar radicular pain    Chronic pain of both knees       Current Outpatient Medications   Medication Sig Dispense Refill    cyclobenzaprine (FLEXERIL) 10 MG tablet Take 1 tablet (10 mg) by mouth nightly as needed for muscle spasms 30 tablet 0    glucosamine 500 MG CAPS capsule Take 500 mg by mouth daily      lisinopril (ZESTRIL) 20 MG tablet Take 20 mg by mouth daily      LORazepam (ATIVAN) 1 MG tablet Take 1 mg by mouth every 6 hours as needed for anxiety      calcium carbonate (TUMS) 500 MG chewable tablet Take 1 chew tab by mouth 2 times daily         No Known Allergies    No family history on file.  Social History     Socioeconomic History    Marital status:        Additional medical/Social/Surgical histories reviewed in [employer] and updated as appropriate.     REVIEW OF SYSTEMS (6/5/2024)  10 point ROS of systems including  Constitutional, Eyes, Respiratory, Cardiovascular, Gastroenterology, Genitourinary, Integumentary, Musculoskeletal, Psychiatric, Allergic/Immunologic were all negative except for pertinent positives noted in my HPI.     PHYSICAL EXAM  VSS    General  - normal appearance, in no obvious distress  HEENT  - conjunctivae not injected, moist mucous membranes, normocephalic/atraumatic head, ears normal appearance, no lesions, mouth normal appearance, no scars, normal dentition and teeth present  CV  - normal popliteal pulse  Pulm  - normal respiratory pattern, non-labored  Musculoskeletal - bilateral knee  - stance: mildly antalgic gait, genu varum  - inspection: some generalized swelling, trace effusion  - palpation: medial joint line tenderness, patella and patellar tendon non-tender, normal popliteal pulse  - ROM: 100 degrees flexion, 0 degrees extension, painful active ROM  - strength: 5/5 in flexion, 5/5 in extension  - neuro: no sensory or motor deficit  - special tests:  (-) Lachman  (-) anterior drawer  (-) posterior drawer  (-) pivot shift  (-) Jarvis  (-) Thessaly  (-) varus at 0 and 30 degrees flexion  (-) valgus at 0 and 30 degrees flexion  (+) Deion s compression test  (-) patellar apprehension  Neuro  - no sensory or motor deficit, grossly normal coordination, normal muscle tone  Skin  - no ecchymosis, erythema, warmth, or induration, no obvious rash  Psych  - interactive, appropriate, normal mood and affect     ASSESSMENT & PLAN  74 yo female with bilateral knee arthritis, not resolved    I independently reviewed the following imaging studies:  Bilateral knee xray: shows arthritis  After a 20 minute discussion and examination, we decided to perform a same day injection for  therapeutic purposes for  Bilateral knees with euflexxa #1  Followup next week for euflexxa  Patient has been doing home exercise physical therapy program for this problem      Appropriate PPE was utilized for prevention of spread of  Covid-19.  Bruce Castillo MD, CAQSM    PROCEDURE:           bilateral  Knee joint injection with euflexxa #1    The patient was apprised of the risks and the benefits of the procedure and consented and a written consent was signed.   The patient s  KNEEs were sterilely prepped with chloraprep.     The patient was injected with euflexxa syringe into the right and left knee with a 1.5-inch 22-gauge needle at the_superiorlateral aspect of their knee joint    There were no complications. The patient tolerated the procedure well. There was minimal bleeding.   The patient was instructed to ice the knees upon leaving clinic and refrain from overuse over the next 3 days.   The patient was instructed to go to the emergency room with any usual pain, swelling, or redness occurred in the injected area.   The patient was given a followup appointment to evaluate response to the injection to their increased range of motion and reduction of pain.  Follow up for euflexxa  Dr Bruce Castillo     Large Joint Injection: bilateral knee    Date/Time: 6/5/2024 11:45 AM    Performed by: Bruce Castillo MD  Authorized by: Bruce Castillo MD    Indications:  Pain and osteoarthritis  Needle Size:  22 G  Guidance: landmark guided    Approach:  Superolateral  Location:  Knee  Laterality:  Bilateral      Medications (Right):  20 mg sodium hyaluronate 20 MG/2ML; 3 mL lidocaine 1 %  Medications (Left):  20 mg sodium hyaluronate 20 MG/2ML; 3 mL lidocaine 1 %  Outcome:  Tolerated well, no immediate complications  Procedure discussed: discussed risks, benefits, and alternatives    Consent Given by:  Patient  Timeout: timeout called immediately prior to procedure    Prep: patient was prepped and draped in usual sterile fashion

## 2024-06-05 NOTE — NURSING NOTE
[address] [address]  Dept: [phone]  ______________________________________________________________________________    Patient: Vernell Hansen   : 1950   MRN: [id number]   2024    INVASIVE PROCEDURE SAFETY CHECKLIST    Date: 2024   Procedure: Bilateral knee joint injections with Euflexxa #1  Patient Name: Vernell Hansen  MRN: [id number]  YOB: 1950    Action: Complete sections as appropriate. Any discrepancy results in a HARD COPY until resolved.     PRE PROCEDURE:  Patient ID verified with 2 identifiers (name and  or MRN): Yes  Procedure and site verified with patient/designee (when able): Yes  Accurate consent documentation in medical record: Yes  H&P (or appropriate assessment) documented in medical record: Yes  H&P must be up to 20 days prior to procedure and updates within 24 hours of procedure as applicable: NA  Relevant diagnostic and radiology test results appropriately labeled and displayed as applicable: NA  Procedure site(s) marked with provider initials: NA    TIMEOUT:  Time-Out performed immediately prior to starting procedure, including verbal and active participation of all team members addressing the following:Yes  * Correct patient identify  * Confirmed that the correct side and site are marked  * An accurate procedure consent form  * Agreement on the procedure to be done  * Correct patient position  * Relevant images and results are properly labeled and appropriately displayed  * The need to administer antibiotics or fluids for irrigation purposes during the procedure as applicable   * Safety precautions based on patient history or medication use    DURING PROCEDURE: Verification of correct person, site, and procedures any time the responsibility for care of the patient is transferred to another member of the care team.       Prior to injection, verified patient identity using patient's  name and date of birth.  Due to injection administration, patient instructed to remain in clinic for 15 minutes  afterwards, and to report any adverse reaction to me immediately.    Joint injection was performed.      Euflexxa x2  Drug Amount Wasted:  None.  Vial/Syringe: Syringe  Expiration Date:  05/04/2025    Lilliana Whitley, ATC  June 5, 2024

## 2024-06-12 ENCOUNTER — OFFICE VISIT (OUTPATIENT)
Dept: ORTHOPEDICS | Facility: CLINIC | Age: 74
End: 2024-06-12
Payer: COMMERCIAL

## 2024-06-12 DIAGNOSIS — M17.0 ARTHRITIS OF BOTH KNEES: Primary | ICD-10-CM

## 2024-06-12 PROCEDURE — 20610 DRAIN/INJ JOINT/BURSA W/O US: CPT | Mod: 50 | Performed by: PREVENTIVE MEDICINE

## 2024-06-12 PROCEDURE — 99207 PR DROP WITH A PROCEDURE: CPT | Performed by: PREVENTIVE MEDICINE

## 2024-06-12 RX ORDER — HYALURONATE SODIUM 10 MG/ML
20 SYRINGE (ML) INTRAARTICULAR
Status: SHIPPED | OUTPATIENT
Start: 2024-06-12

## 2024-06-12 RX ORDER — LIDOCAINE HYDROCHLORIDE 10 MG/ML
3 INJECTION, SOLUTION INFILTRATION; PERINEURAL
Status: SHIPPED | OUTPATIENT
Start: 2024-06-12

## 2024-06-12 RX ADMIN — LIDOCAINE HYDROCHLORIDE 3 ML: 10 INJECTION, SOLUTION INFILTRATION; PERINEURAL at 11:13

## 2024-06-12 RX ADMIN — Medication 20 MG: at 11:13

## 2024-06-12 NOTE — NURSING NOTE
[address] [address]  Dept: [phone]  ______________________________________________________________________________    Patient: Vernell Hansen   : 1950   MRN: [id number]   2024    INVASIVE PROCEDURE SAFETY CHECKLIST    Date: 2024   Procedure: bilateral knee joint injection with Euflexxa #2  Patient Name: Vernell Hansen  MRN: [id number]  YOB: 1950    Action: Complete sections as appropriate. Any discrepancy results in a HARD COPY until resolved.     PRE PROCEDURE:  Patient ID verified with 2 identifiers (name and  or MRN): Yes  Procedure and site verified with patient/designee (when able): Yes  Accurate consent documentation in medical record: Yes  H&P (or appropriate assessment) documented in medical record: Yes  H&P must be up to 20 days prior to procedure and updates within 24 hours of procedure as applicable: NA  Relevant diagnostic and radiology test results appropriately labeled and displayed as applicable: NA  Procedure site(s) marked with provider initials: NA    TIMEOUT:  Time-Out performed immediately prior to starting procedure, including verbal and active participation of all team members addressing the following:Yes  * Correct patient identify  * Confirmed that the correct side and site are marked  * An accurate procedure consent form  * Agreement on the procedure to be done  * Correct patient position  * Relevant images and results are properly labeled and appropriately displayed  * The need to administer antibiotics or fluids for irrigation purposes during the procedure as applicable   * Safety precautions based on patient history or medication use    DURING PROCEDURE: Verification of correct person, site, and procedures any time the responsibility for care of the patient is transferred to another member of the care team.       Prior to injection, verified patient identity using patient's  name and date of birth.  Due to injection administration, patient instructed to remain in clinic for 15 minutes  afterwards, and to report any adverse reaction to me immediately.    Joint injection was performed.      Euflexxa x2  Drug Amount Wasted:  None.  Vial/Syringe: Syringe  Expiration Date:  05/04/2025    Lilliana Whitley, ATC  June 12, 2024

## 2024-06-12 NOTE — PROGRESS NOTES
Vernell returns for bilateral knee injections with euflexxa #2  Diagnosis; bilateral knee arthritis  PROCEDURE:           bilateral  Knee joint injection with euflexxa #2    The patient was apprised of the risks and the benefits of the procedure and consented and a written consent was signed.   The patient s  KNEEs were sterilely prepped with chloraprep.     The patient was injected with euflexxa syringe into the right and left knee with a 1.5-inch 22-gauge needle at the_superiorlateral aspect of their knee joint    There were no complications. The patient tolerated the procedure well. There was minimal bleeding.   The patient was instructed to ice the knees upon leaving clinic and refrain from overuse over the next 3 days.   The patient was instructed to go to the emergency room with any usual pain, swelling, or redness occurred in the injected area.   The patient was given a followup appointment to evaluate response to the injection to their increased range of motion and reduction of pain.  Follow up for euflexxa  Dr Bruce Castillo           Large Joint Injection: bilateral knee    Date/Time: 6/12/2024 11:13 AM    Performed by: Bruce Castillo MD  Authorized by: Bruce Castillo MD    Indications:  Pain and osteoarthritis  Needle Size:  22 G  Guidance: landmark guided    Approach:  Superolateral  Location:  Knee  Laterality:  Bilateral      Medications (Right):  20 mg sodium hyaluronate 20 MG/2ML; 3 mL lidocaine 1 %  Medications (Left):  20 mg sodium hyaluronate 20 MG/2ML; 3 mL lidocaine 1 %  Outcome:  Tolerated well, no immediate complications  Procedure discussed: discussed risks, benefits, and alternatives    Consent Given by:  Patient  Timeout: timeout called immediately prior to procedure    Prep: patient was prepped and draped in usual sterile fashion

## 2024-06-19 ENCOUNTER — OFFICE VISIT (OUTPATIENT)
Dept: ORTHOPEDICS | Facility: CLINIC | Age: 74
End: 2024-06-19
Payer: COMMERCIAL

## 2024-06-19 DIAGNOSIS — M17.0 ARTHRITIS OF BOTH KNEES: Primary | ICD-10-CM

## 2024-06-19 PROCEDURE — 99207 PR DROP WITH A PROCEDURE: CPT | Performed by: PREVENTIVE MEDICINE

## 2024-06-19 PROCEDURE — 20610 DRAIN/INJ JOINT/BURSA W/O US: CPT | Mod: 50 | Performed by: PREVENTIVE MEDICINE

## 2024-06-19 RX ORDER — LIDOCAINE HYDROCHLORIDE 10 MG/ML
3 INJECTION, SOLUTION INFILTRATION; PERINEURAL
Status: SHIPPED | OUTPATIENT
Start: 2024-06-19

## 2024-06-19 RX ORDER — HYALURONATE SODIUM 10 MG/ML
20 SYRINGE (ML) INTRAARTICULAR
Status: SHIPPED | OUTPATIENT
Start: 2024-06-19

## 2024-06-19 RX ADMIN — LIDOCAINE HYDROCHLORIDE 3 ML: 10 INJECTION, SOLUTION INFILTRATION; PERINEURAL at 11:21

## 2024-06-19 RX ADMIN — Medication 20 MG: at 11:21

## 2024-06-19 NOTE — PROGRESS NOTES
Vernell returns for bilateral knee injections with euflexxa #3  Doing well, left knee still bothersome but improved  Diagnosis: bilateral knee arthritis  PROCEDURE:           bilateral  Knee joint injection with euflexxa #3    The patient was apprised of the risks and the benefits of the procedure and consented and a written consent was signed.   The patient s  KNEEs were sterilely prepped with chloraprep.     The patient was injected with euflexxa syringe into the right and left knee with a 1.5-inch 22-gauge needle at the_superiorlateral aspect of their knee joint    There were no complications. The patient tolerated the procedure well. There was minimal bleeding.   The patient was instructed to ice the knees upon leaving clinic and refrain from overuse over the next 3 days.   The patient was instructed to go to the emergency room with any usual pain, swelling, or redness occurred in the injected area.   The patient was given a followup appointment to evaluate response to the injection to their increased range of motion and reduction of pain.  Follow up for euflexxa  Dr Bruce Castillo             Large Joint Injection: bilateral knee    Date/Time: 6/19/2024 11:21 AM    Performed by: Bruce Castillo MD  Authorized by: Bruce Castillo MD    Indications:  Pain and osteoarthritis  Needle Size:  22 G  Guidance: landmark guided    Approach:  Superolateral  Location:  Knee  Laterality:  Bilateral      Medications (Right):  20 mg sodium hyaluronate 20 MG/2ML; 3 mL lidocaine 1 %  Medications (Left):  20 mg sodium hyaluronate 20 MG/2ML; 3 mL lidocaine 1 %  Outcome:  Tolerated well, no immediate complications  Procedure discussed: discussed risks, benefits, and alternatives    Consent Given by:  Patient  Timeout: timeout called immediately prior to procedure    Prep: patient was prepped and draped in usual sterile fashion

## 2024-06-19 NOTE — LETTER
6/19/2024      Vernell Hansen  [address]  [place] [address]      Dear Colleague,    Thank you for referring your patient, Vernell Hansen, to the [facility] [place]. Please see a copy of my visit note below.    Vernell returns for bilateral knee injections with euflexxa #3  Doing well, left knee still bothersome but improved  Diagnosis: bilateral knee arthritis  PROCEDURE:           bilateral  Knee joint injection with euflexxa #3    The patient was apprised of the risks and the benefits of the procedure and consented and a written consent was signed.   The patient s  KNEEs were sterilely prepped with chloraprep.     The patient was injected with euflexxa syringe into the right and left knee with a 1.5-inch 22-gauge needle at the_superiorlateral aspect of their knee joint    There were no complications. The patient tolerated the procedure well. There was minimal bleeding.   The patient was instructed to ice the knees upon leaving clinic and refrain from overuse over the next 3 days.   The patient was instructed to go to the emergency room with any usual pain, swelling, or redness occurred in the injected area.   The patient was given a followup appointment to evaluate response to the injection to their increased range of motion and reduction of pain.  Follow up for euflexxa  Dr Bruce Castillo             Large Joint Injection: bilateral knee    Date/Time: 6/19/2024 11:21 AM    Performed by: Bruce Castillo MD  Authorized by: Bruce Castillo MD    Indications:  Pain and osteoarthritis  Needle Size:  22 G  Guidance: landmark guided    Approach:  Superolateral  Location:  Knee  Laterality:  Bilateral      Medications (Right):  20 mg sodium hyaluronate 20 MG/2ML; 3 mL lidocaine 1 %  Medications (Left):  20 mg sodium hyaluronate 20 MG/2ML; 3 mL lidocaine 1 %  Outcome:  Tolerated well, no immediate complications  Procedure discussed: discussed risks, benefits, and  alternatives    Consent Given by:  Patient  Timeout: timeout called immediately prior to procedure    Prep: patient was prepped and draped in usual sterile fashion          Again, thank you for allowing me to participate in the care of your patient.        Sincerely,        Bruce Castillo MD

## 2024-06-19 NOTE — NURSING NOTE
[address] [address]  Dept: [phone]  ______________________________________________________________________________    Patient: Vernell Hansen   : 1950   MRN: [id number]   2024    INVASIVE PROCEDURE SAFETY CHECKLIST    Date: 2024   Procedure: bilateral knee joint injections with Euflexxa #3  Patient Name: Vernell Hansen  MRN: [id number]  YOB: 1950    Action: Complete sections as appropriate. Any discrepancy results in a HARD COPY until resolved.     PRE PROCEDURE:  Patient ID verified with 2 identifiers (name and  or MRN): Yes  Procedure and site verified with patient/designee (when able): Yes  Accurate consent documentation in medical record: Yes  H&P (or appropriate assessment) documented in medical record: Yes  H&P must be up to 20 days prior to procedure and updates within 24 hours of procedure as applicable: NA  Relevant diagnostic and radiology test results appropriately labeled and displayed as applicable: NA  Procedure site(s) marked with provider initials: NA    TIMEOUT:  Time-Out performed immediately prior to starting procedure, including verbal and active participation of all team members addressing the following:Yes  * Correct patient identify  * Confirmed that the correct side and site are marked  * An accurate procedure consent form  * Agreement on the procedure to be done  * Correct patient position  * Relevant images and results are properly labeled and appropriately displayed  * The need to administer antibiotics or fluids for irrigation purposes during the procedure as applicable   * Safety precautions based on patient history or medication use    DURING PROCEDURE: Verification of correct person, site, and procedures any time the responsibility for care of the patient is transferred to another member of the care team.       Prior to injection, verified patient identity using patient's  name and date of birth.  Due to injection administration, patient instructed to remain in clinic for 15 minutes  afterwards, and to report any adverse reaction to me immediately.    Joint injection was performed.      Euflexxa #2  Drug Amount Wasted:  None.  Vial/Syringe: Syringe  Expiration Date:  05/04/2025    Lilliana Whitley, ATC  June 19, 2024

## 2025-02-25 ENCOUNTER — OFFICE VISIT (OUTPATIENT)
Dept: ORTHOPEDICS | Facility: CLINIC | Age: 75
End: 2025-02-25
Payer: COMMERCIAL

## 2025-02-25 ENCOUNTER — ANCILLARY PROCEDURE (OUTPATIENT)
Dept: GENERAL RADIOLOGY | Facility: CLINIC | Age: 75
End: 2025-02-25
Attending: PREVENTIVE MEDICINE
Payer: COMMERCIAL

## 2025-02-25 DIAGNOSIS — M17.0 PRIMARY OSTEOARTHRITIS OF BOTH KNEES: ICD-10-CM

## 2025-02-25 DIAGNOSIS — M54.16 LUMBAR RADICULAR PAIN: Primary | ICD-10-CM

## 2025-02-25 PROCEDURE — 1125F AMNT PAIN NOTED PAIN PRSNT: CPT | Performed by: PREVENTIVE MEDICINE

## 2025-02-25 PROCEDURE — 73562 X-RAY EXAM OF KNEE 3: CPT | Mod: LT | Performed by: RADIOLOGY

## 2025-02-25 PROCEDURE — 99214 OFFICE O/P EST MOD 30 MIN: CPT | Performed by: PREVENTIVE MEDICINE

## 2025-02-25 RX ORDER — BENZONATATE 100 MG/1
100 CAPSULE ORAL 2 TIMES DAILY PRN
COMMUNITY
Start: 2025-02-24

## 2025-02-25 RX ORDER — PREDNISONE 20 MG/1
1 TABLET ORAL DAILY
COMMUNITY
Start: 2025-02-24 | End: 2025-02-25

## 2025-02-25 NOTE — LETTER
2/25/2025      Vernell Hansen  [address]  [place] [address]      Dear Colleague,    Thank you for referring your patient, Vernell Hansen, to the [facility] [place]. Please see a copy of my visit note below.    HISTORY OF PRESENT ILLNESS  Ms. Hansen is a pleasant 74 year old year old female who presents to clinic today with the following:    What problem are you here for: Follow up for bilateral knee pain/OA. Patient is asking for a repeat cortisone injection as she leaves on vacation on Saturday 3/1/2025.     How long have you had this problem: Chronic     Have you had any recent imaging of this problem? Xrays/MRI/CT scans:  - Updated XR of bilateral knees taken at appointment today.     Have you had treatments for this problem in the past?  -Medications: She will use Aleve prn.   -Physical therapy: [facility]   -Injections:   Bilateral Knee Euflexxa 6/19/2024: she reports these injections provided over 6 months of over 75% decreased pain in her bilateral knees. And she continues to feel improvement  No pain today in either knee    How severe is this problem today? 0-10 scale: 1/10      MEDICAL HISTORY  Patient Active Problem List   Diagnosis    Bilateral knee pain    Lumbar radicular pain    Chronic pain of both knees       Current Outpatient Medications   Medication Sig Dispense Refill    calcium carbonate (TUMS) 500 MG chewable tablet Take 1 chew tab by mouth 2 times daily      cyclobenzaprine (FLEXERIL) 10 MG tablet Take 1 tablet (10 mg) by mouth nightly as needed for muscle spasms 30 tablet 0    glucosamine 500 MG CAPS capsule Take 500 mg by mouth daily      lisinopril (ZESTRIL) 20 MG tablet Take 20 mg by mouth daily      LORazepam (ATIVAN) 1 MG tablet Take 1 mg by mouth every 6 hours as needed for anxiety       No Known Allergies    No family history on file.    Social History     Socioeconomic History    Marital status:        Additional medical/Social/Surgical  histories reviewed in [employer] and updated as appropriate.     REVIEW OF SYSTEMS (2/25/2025)  10 point ROS of systems including Constitutional, Eyes, Respiratory, Cardiovascular, Gastroenterology, Genitourinary, Integumentary, Musculoskeletal, Psychiatric, Allergic/Immunologic were all negative except for pertinent positives noted in my HPI.       PHYSICAL EXAM  VSS  General  - normal appearance, in no obvious distress  HEENT  - conjunctivae not injected, moist mucous membranes, normocephalic/atraumatic head, ears normal appearance, no lesions, mouth normal appearance, no scars, normal dentition and teeth present  CV  - normal popliteal pulse  Pulm  - normal respiratory pattern, non-labored  Musculoskeletal - bilateral knee  - stance: normal gait without limp, normal single leg squat, no obvious leg length discrepancy  - inspection: no swelling or effusion,  normal bone and joint alignment, no obvious deformity  - palpation: no joint line tenderness, patella and patellar tendon non-tender  - ROM: 135 degrees flexion, -5 degrees extension, not painful, normal actively and passively compared to contralateral  - strength: 5/5 in flexion, 5/5 in extension  - special tests:  (-) Lachman  (-) anterior drawer  (-) posterior drawer  (-) pivot shift  (-) Jarvis  (-) Thessaly  (-) varus at 0 and 30 degrees flexion  (-) valgus at 0 and 30 degrees flexion  (+) Deion s compression test- mild crepitus  (-) patellar apprehension    Neuro  - no sensory or motor deficit, grossly normal coordination, normal muscle tone  Skin  - no ecchymosis, erythema, warmth, or induration, no obvious rash  Psych  - interactive, appropriate, normal mood and affect           ASSESSMENT & PLAN  75 yo female with bilateral knee arthritis, stable, not resolved    I independently reviewed the following imaging studies:  Bilateral knee xrays: shows arthritis bilateral knees  Discussed plan to get euflexxa injections started up in a few months   Cont.  HEP  Followup for euflexxa and discussion about lumbar spine/scoliosis  Patient has been doing home exercise physical therapy program for this problem      Appropriate PPE was utilized for prevention of spread of Covid-19.  Bruce Castillo MD, CAM      Again, thank you for allowing me to participate in the care of your patient.        Sincerely,    Bruce Castillo MD    Electronically signed

## 2025-02-25 NOTE — PROGRESS NOTES
HISTORY OF PRESENT ILLNESS  Ms. Hansen is a pleasant 74 year old year old female who presents to clinic today with the following:    What problem are you here for: Follow up for bilateral knee pain/OA. Patient is asking for a repeat cortisone injection as she leaves on vacation on Saturday 3/1/2025.     How long have you had this problem: Chronic     Have you had any recent imaging of this problem? Xrays/MRI/CT scans:  - Updated XR of bilateral knees taken at appointment today.     Have you had treatments for this problem in the past?  -Medications: She will use Aleve prn.   -Physical therapy: [facility]   -Injections:   Bilateral Knee Euflexxa 6/19/2024: she reports these injections provided over 6 months of over 75% decreased pain in her bilateral knees. And she continues to feel improvement  No pain today in either knee    How severe is this problem today? 0-10 scale: 1/10    MEDICAL HISTORY  Patient Active Problem List   Diagnosis    Bilateral knee pain    Lumbar radicular pain    Chronic pain of both knees       Current Outpatient Medications   Medication Sig Dispense Refill    calcium carbonate (TUMS) 500 MG chewable tablet Take 1 chew tab by mouth 2 times daily      cyclobenzaprine (FLEXERIL) 10 MG tablet Take 1 tablet (10 mg) by mouth nightly as needed for muscle spasms 30 tablet 0    glucosamine 500 MG CAPS capsule Take 500 mg by mouth daily      lisinopril (ZESTRIL) 20 MG tablet Take 20 mg by mouth daily      LORazepam (ATIVAN) 1 MG tablet Take 1 mg by mouth every 6 hours as needed for anxiety         No Known Allergies    No family history on file.  Social History     Socioeconomic History    Marital status:        Additional medical/Social/Surgical histories reviewed in [facility] and updated as appropriate.     REVIEW OF SYSTEMS (2/25/2025)  10 point ROS of systems including Constitutional, Eyes, Respiratory, Cardiovascular, Gastroenterology, Genitourinary, Integumentary, Musculoskeletal, Psychiatric,  Allergic/Immunologic were all negative except for pertinent positives noted in my HPI.     PHYSICAL EXAM  VSS  General  - normal appearance, in no obvious distress  HEENT  - conjunctivae not injected, moist mucous membranes, normocephalic/atraumatic head, ears normal appearance, no lesions, mouth normal appearance, no scars, normal dentition and teeth present  CV  - normal popliteal pulse  Pulm  - normal respiratory pattern, non-labored  Musculoskeletal - bilateral knee  - stance: normal gait without limp, normal single leg squat, no obvious leg length discrepancy  - inspection: no swelling or effusion,  normal bone and joint alignment, no obvious deformity  - palpation: no joint line tenderness, patella and patellar tendon non-tender  - ROM: 135 degrees flexion, -5 degrees extension, not painful, normal actively and passively compared to contralateral  - strength: 5/5 in flexion, 5/5 in extension  - special tests:  (-) Lachman  (-) anterior drawer  (-) posterior drawer  (-) pivot shift  (-) Jarvis  (-) Thessaly  (-) varus at 0 and 30 degrees flexion  (-) valgus at 0 and 30 degrees flexion  (+) Deion s compression test- mild crepitus  (-) patellar apprehension    Neuro  - no sensory or motor deficit, grossly normal coordination, normal muscle tone  Skin  - no ecchymosis, erythema, warmth, or induration, no obvious rash  Psych  - interactive, appropriate, normal mood and affect       ASSESSMENT & PLAN  73 yo female with bilateral knee arthritis, stable, not resolved    I independently reviewed the following imaging studies:  Bilateral knee xrays: shows arthritis bilateral knees  Discussed plan to get euflexxa injections started up in a few months   Cont. [facility]P  Followup for euflexxa and discussion about lumbar spine/scoliosis  Patient has been doing home exercise physical therapy program for this problem      Appropriate PPE was utilized for prevention of spread of Covid-19.  Bruce Castillo MD, CAM

## 2025-03-12 ENCOUNTER — OFFICE VISIT (OUTPATIENT)
Dept: ORTHOPEDICS | Facility: CLINIC | Age: 75
End: 2025-03-12
Payer: COMMERCIAL

## 2025-03-12 DIAGNOSIS — M17.0 PRIMARY OSTEOARTHRITIS OF BOTH KNEES: Primary | ICD-10-CM

## 2025-03-12 PROCEDURE — 99207 PR DROP WITH A PROCEDURE: CPT | Performed by: PREVENTIVE MEDICINE

## 2025-03-12 PROCEDURE — 20610 DRAIN/INJ JOINT/BURSA W/O US: CPT | Mod: 50 | Performed by: PREVENTIVE MEDICINE

## 2025-03-12 RX ORDER — LIDOCAINE HYDROCHLORIDE 10 MG/ML
3 INJECTION, SOLUTION INFILTRATION; PERINEURAL
Status: COMPLETED | OUTPATIENT
Start: 2025-03-12 | End: 2025-03-12

## 2025-03-12 RX ADMIN — LIDOCAINE HYDROCHLORIDE 3 ML: 10 INJECTION, SOLUTION INFILTRATION; PERINEURAL at 10:53

## 2025-03-12 NOTE — LETTER
3/12/2025      Vernell Hansen  [address]  [place] [address]      Dear Colleague,    Thank you for referring your patient, Vernell Hansen, to the [facility] [place]. Please see a copy of my visit note below.    Vernell returns for bilateral knee injections with euflexxa #1  Diagnosis: bilateral knee arthritis   PROCEDURE:           bilateral  Knee joint injection with euflexxa #1    The patient was apprised of the risks and the benefits of the procedure and consented and a written consent was signed.   The patient s  KNEEs were sterilely prepped with chloraprep.     The patient was injected with euflexxa syringe into the right and left knee with a 1.5-inch 22-gauge needle at the_superiorlateral aspect of their knee joint    There were no complications. The patient tolerated the procedure well. There was minimal bleeding.   The patient was instructed to ice the knees upon leaving clinic and refrain from overuse over the next 3 days.   The patient was instructed to go to the emergency room with any usual pain, swelling, or redness occurred in the injected area.   The patient was given a followup appointment to evaluate response to the injection to their increased range of motion and reduction of pain.  Follow up for euflexxa  Dr Bruce Castillo         Large Joint Injection: bilateral knee    Date/Time: 3/12/2025 10:53 AM    Performed by: Bruce Castillo MD  Authorized by: Bruce Castillo MD    Indications:  Pain and osteoarthritis  Needle Size:  22 G  Guidance: landmark guided    Approach:  Superolateral  Location:  Knee  Laterality:  Bilateral      Medications (Right):  20 mg sodium hyaluronate 20 MG/2ML; 3 mL lidocaine 1 %  Medications (Left):  20 mg sodium hyaluronate 20 MG/2ML; 3 mL lidocaine 1 %  Outcome:  Tolerated well, no immediate complications  Procedure discussed: discussed risks, benefits, and alternatives    Consent Given by:  Patient  Timeout:  timeout called immediately prior to procedure    Prep: patient was prepped and draped in usual sterile fashion          Again, thank you for allowing me to participate in the care of your patient.        Sincerely,        Bruce Castillo MD    Electronically signed

## 2025-03-12 NOTE — PROGRESS NOTES
Vernell returns for bilateral knee injections with euflexxa #1  Diagnosis: bilateral knee arthritis   PROCEDURE:           bilateral  Knee joint injection with euflexxa #1    The patient was apprised of the risks and the benefits of the procedure and consented and a written consent was signed.   The patient s  KNEEs were sterilely prepped with chloraprep.     The patient was injected with euflexxa syringe into the right and left knee with a 1.5-inch 22-gauge needle at the_superiorlateral aspect of their knee joint    There were no complications. The patient tolerated the procedure well. There was minimal bleeding.   The patient was instructed to ice the knees upon leaving clinic and refrain from overuse over the next 3 days.   The patient was instructed to go to the emergency room with any usual pain, swelling, or redness occurred in the injected area.   The patient was given a followup appointment to evaluate response to the injection to their increased range of motion and reduction of pain.  Follow up for euflexxa  Dr Bruce Castillo         Large Joint Injection: bilateral knee    Date/Time: 3/12/2025 10:53 AM    Performed by: Bruce Castillo MD  Authorized by: Bruce Castillo MD    Indications:  Pain and osteoarthritis  Needle Size:  22 G  Guidance: landmark guided    Approach:  Superolateral  Location:  Knee  Laterality:  Bilateral      Medications (Right):  20 mg sodium hyaluronate 20 MG/2ML; 3 mL lidocaine 1 %  Medications (Left):  20 mg sodium hyaluronate 20 MG/2ML; 3 mL lidocaine 1 %  Outcome:  Tolerated well, no immediate complications  Procedure discussed: discussed risks, benefits, and alternatives    Consent Given by:  Patient  Timeout: timeout called immediately prior to procedure    Prep: patient was prepped and draped in usual sterile fashion

## 2025-03-12 NOTE — NURSING NOTE
[address] [address]  Dept: [phone]  ______________________________________________________________________________    Patient: Vernell Hansen   : 1950   MRN: [id number]   2025    INVASIVE PROCEDURE SAFETY CHECKLIST    Date: 2025   Procedure: bilateral knee joint injection with Euflexxa #1  Patient Name: Vernell Hansen  MRN: [id number]  YOB: 1950    Action: Complete sections as appropriate. Any discrepancy results in a HARD COPY until resolved.     PRE PROCEDURE:  Patient ID verified with 2 identifiers (name and  or MRN): Yes  Procedure and site verified with patient/designee (when able): Yes  Accurate consent documentation in medical record: Yes  H&P (or appropriate assessment) documented in medical record: Yes  H&P must be up to 20 days prior to procedure and updates within 24 hours of procedure as applicable: Yes  Relevant diagnostic and radiology test results appropriately labeled and displayed as applicable: NA  Procedure site(s) marked with provider initials: NA    TIMEOUT:  Time-Out performed immediately prior to starting procedure, including verbal and active participation of all team members addressing the following:Yes  * Correct patient identify  * Confirmed that the correct side and site are marked  * An accurate procedure consent form  * Agreement on the procedure to be done  * Correct patient position  * Relevant images and results are properly labeled and appropriately displayed  * The need to administer antibiotics or fluids for irrigation purposes during the procedure as applicable   * Safety precautions based on patient history or medication use    DURING PROCEDURE: Verification of correct person, site, and procedures any time the responsibility for care of the patient is transferred to another member of the care team.       Prior to injection, verified patient identity using patient's  name and date of birth.  Due to injection administration, patient instructed to remain in clinic for 15 minutes  afterwards, and to report any adverse reaction to me immediately.    Joint injection was performed.      Euflexxa x2  Drug Amount Wasted:  None.  Vial/Syringe: Syringe  Expiration Date:  12/21/2025    Lilliana Whitley, ATC  March 12, 2025

## 2025-03-19 ENCOUNTER — OFFICE VISIT (OUTPATIENT)
Dept: ORTHOPEDICS | Facility: CLINIC | Age: 75
End: 2025-03-19
Payer: COMMERCIAL

## 2025-03-19 DIAGNOSIS — M17.0 ARTHRITIS OF BOTH KNEES: Primary | ICD-10-CM

## 2025-03-19 PROCEDURE — 20610 DRAIN/INJ JOINT/BURSA W/O US: CPT | Mod: 50 | Performed by: PREVENTIVE MEDICINE

## 2025-03-19 PROCEDURE — 99207 PR DROP WITH A PROCEDURE: CPT | Performed by: PREVENTIVE MEDICINE

## 2025-03-19 RX ORDER — LIDOCAINE HYDROCHLORIDE 10 MG/ML
3 INJECTION, SOLUTION INFILTRATION; PERINEURAL
Status: COMPLETED | OUTPATIENT
Start: 2025-03-19 | End: 2025-03-19

## 2025-03-19 RX ADMIN — LIDOCAINE HYDROCHLORIDE 3 ML: 10 INJECTION, SOLUTION INFILTRATION; PERINEURAL at 13:58

## 2025-03-19 NOTE — NURSING NOTE
[address] [address]  Dept: [phone]  ______________________________________________________________________________    Patient: Vernell Hansen   : 1950   MRN: [id number]   2025    INVASIVE PROCEDURE SAFETY CHECKLIST    Date: 2025   Procedure: bilateral knee joint injections with Euflexxa #2  Patient Name: Vernell Hansen  MRN: [id number]  YOB: 1950    Action: Complete sections as appropriate. Any discrepancy results in a HARD COPY until resolved.     PRE PROCEDURE:  Patient ID verified with 2 identifiers (name and  or MRN): Yes  Procedure and site verified with patient/designee (when able): Yes  Accurate consent documentation in medical record: Yes  H&P (or appropriate assessment) documented in medical record: Yes  H&P must be up to 20 days prior to procedure and updates within 24 hours of procedure as applicable: Yes  Relevant diagnostic and radiology test results appropriately labeled and displayed as applicable: NA  Procedure site(s) marked with provider initials: NA    TIMEOUT:  Time-Out performed immediately prior to starting procedure, including verbal and active participation of all team members addressing the following:Yes  * Correct patient identify  * Confirmed that the correct side and site are marked  * An accurate procedure consent form  * Agreement on the procedure to be done  * Correct patient position  * Relevant images and results are properly labeled and appropriately displayed  * The need to administer antibiotics or fluids for irrigation purposes during the procedure as applicable   * Safety precautions based on patient history or medication use    DURING PROCEDURE: Verification of correct person, site, and procedures any time the responsibility for care of the patient is transferred to another member of the care team.       Prior to injection, verified patient identity using  patient's name and date of birth.  Due to injection administration, patient instructed to remain in clinic for 15 minutes  afterwards, and to report any adverse reaction to me immediately.    Joint injection was performed.      Euflexxa x2  Drug Amount Wasted:  None.  Vial/Syringe: Syringe  Expiration Date:  12/21/2025    Lilliana Whitley, ATC  March 19, 2025

## 2025-03-19 NOTE — LETTER
3/19/2025      Vernell Hansen  [address]  [place] [address]      Dear Colleague,    Thank you for referring your patient, Vernell Hansen, to the [facility] [place]. Please see a copy of my visit note below.    Vernell returns for bilateral knee injection #2 euflexxa   Diagnosis : bilateral knee arthritis  PROCEDURE:           bilateral  Knee joint injection with euflexxa #2    The patient was apprised of the risks and the benefits of the procedure and consented and a written consent was signed.   The patient s  KNEEs were sterilely prepped with chloraprep.     The patient was injected with euflexxa syringe into the right and left knee with a 1.5-inch 22-gauge needle at the_superiorlateral aspect of their knee joint    There were no complications. The patient tolerated the procedure well. There was minimal bleeding.   The patient was instructed to ice the knees upon leaving clinic and refrain from overuse over the next 3 days.   The patient was instructed to go to the emergency room with any usual pain, swelling, or redness occurred in the injected area.   The patient was given a followup appointment to evaluate response to the injection to their increased range of motion and reduction of pain.  Follow up for euflexxa  Dr Bruce Castillo         Large Joint Injection: bilateral knee    Date/Time: 3/19/2025 1:58 PM    Performed by: Bruce Castillo MD  Authorized by: Bruce Castillo MD    Indications:  Pain and osteoarthritis  Needle Size:  22 G  Guidance: landmark guided    Approach:  Superolateral  Location:  Knee  Laterality:  Bilateral      Medications (Right):  20 mg sodium hyaluronate 20 MG/2ML; 3 mL lidocaine 1 %  Medications (Left):  20 mg sodium hyaluronate 20 MG/2ML; 3 mL lidocaine 1 %  Outcome:  Tolerated well, no immediate complications  Procedure discussed: discussed risks, benefits, and alternatives    Consent Given by:  Patient  Timeout: timeout  called immediately prior to procedure    Prep: patient was prepped and draped in usual sterile fashion          Again, thank you for allowing me to participate in the care of your patient.        Sincerely,        Bruce Castillo MD    Electronically signed

## 2025-03-19 NOTE — PROGRESS NOTES
Vernell returns for bilateral knee injection #2 euflexxa   Diagnosis : bilateral knee arthritis  PROCEDURE:           bilateral  Knee joint injection with euflexxa #2    The patient was apprised of the risks and the benefits of the procedure and consented and a written consent was signed.   The patient s  KNEEs were sterilely prepped with chloraprep.     The patient was injected with euflexxa syringe into the right and left knee with a 1.5-inch 22-gauge needle at the_superiorlateral aspect of their knee joint    There were no complications. The patient tolerated the procedure well. There was minimal bleeding.   The patient was instructed to ice the knees upon leaving clinic and refrain from overuse over the next 3 days.   The patient was instructed to go to the emergency room with any usual pain, swelling, or redness occurred in the injected area.   The patient was given a followup appointment to evaluate response to the injection to their increased range of motion and reduction of pain.  Follow up for euflexxa  Dr Bruce Castillo

## 2025-03-19 NOTE — PROGRESS NOTES
Large Joint Injection: bilateral knee    Date/Time: 3/19/2025 1:58 PM    Performed by: Bruce Castillo MD  Authorized by: Bruce Castillo MD    Indications:  Pain and osteoarthritis  Needle Size:  22 G  Guidance: landmark guided    Approach:  Superolateral  Location:  Knee  Laterality:  Bilateral      Medications (Right):  20 mg sodium hyaluronate 20 MG/2ML; 3 mL lidocaine 1 %  Medications (Left):  20 mg sodium hyaluronate 20 MG/2ML; 3 mL lidocaine 1 %  Outcome:  Tolerated well, no immediate complications  Procedure discussed: discussed risks, benefits, and alternatives    Consent Given by:  Patient  Timeout: timeout called immediately prior to procedure    Prep: patient was prepped and draped in usual sterile fashion

## 2025-03-26 ENCOUNTER — OFFICE VISIT (OUTPATIENT)
Dept: ORTHOPEDICS | Facility: CLINIC | Age: 75
End: 2025-03-26
Payer: COMMERCIAL

## 2025-03-26 DIAGNOSIS — M17.0 ARTHRITIS OF BOTH KNEES: Primary | ICD-10-CM

## 2025-03-26 PROCEDURE — 20611 DRAIN/INJ JOINT/BURSA W/US: CPT | Mod: 50 | Performed by: PREVENTIVE MEDICINE

## 2025-03-26 PROCEDURE — 99207 PR DROP WITH A PROCEDURE: CPT | Performed by: PREVENTIVE MEDICINE

## 2025-03-26 NOTE — LETTER
3/26/2025      Vernell Hansen  [address]  [place] [address]      Dear Colleague,    Thank you for referring your patient, Vernell Hansen, to the [facility] [place]. Please see a copy of my visit note below.    Vernell returns for bilateral knee injections with euflexxa #3  Doing well  Diagnosis; bilateral knee arthritis  PROCEDURE:           bilateral  Knee joint injection with euflexxa #3    The patient was apprised of the risks and the benefits of the procedure and consented and a written consent was signed.   The patient s  KNEEs were sterilely prepped with chloraprep.     The patient was injected with euflexxa syringe into the right and left knee with a 1.5-inch 22-gauge needle at the_superiorlateral aspect of their knee joint    There were no complications. The patient tolerated the procedure well. There was minimal bleeding.   The patient was instructed to ice the knees upon leaving clinic and refrain from overuse over the next 3 days.   The patient was instructed to go to the emergency room with any usual pain, swelling, or redness occurred in the injected area.   The patient was given a followup appointment to evaluate response to the injection to their increased range of motion and reduction of pain.  Follow up for euflexxa in 6 + months  Dr Bruce Castillo     Large Joint Injection/Arthocentesis: bilateral knee    Date/Time: 3/26/2025 10:31 AM    Performed by: Bruce Castillo MD  Authorized by: Bruce Castillo MD    Indications:  Pain  Needle Size:  22 G  Guidance: ultrasound    Location:  Knee  Laterality:  Bilateral      Medications (Right):  20 mg sodium hyaluronate 20 MG/2ML  Medications (Left):  20 mg sodium hyaluronate 20 MG/2ML  Outcome:  Tolerated well, no immediate complications  Procedure discussed: discussed risks, benefits, and alternatives    Consent Given by:  Patient  Timeout: timeout called immediately prior to procedure    Prep:  patient was prepped and draped in usual sterile fashion          Again, thank you for allowing me to participate in the care of your patient.        Sincerely,        Bruce Castillo MD    Electronically signed

## 2025-03-26 NOTE — PROGRESS NOTES
Vernell returns for bilateral knee injections with euflexxa #3  Doing well  Diagnosis; bilateral knee arthritis  PROCEDURE:           bilateral  Knee joint injection with euflexxa #3    The patient was apprised of the risks and the benefits of the procedure and consented and a written consent was signed.   The patient s  KNEEs were sterilely prepped with chloraprep.     The patient was injected with euflexxa syringe into the right and left knee with a 1.5-inch 22-gauge needle at the_superiorlateral aspect of their knee joint    There were no complications. The patient tolerated the procedure well. There was minimal bleeding.   The patient was instructed to ice the knees upon leaving clinic and refrain from overuse over the next 3 days.   The patient was instructed to go to the emergency room with any usual pain, swelling, or redness occurred in the injected area.   The patient was given a followup appointment to evaluate response to the injection to their increased range of motion and reduction of pain.  Follow up for euflexxa in 6 + months  Dr Bruce Castillo     Large Joint Injection/Arthocentesis: bilateral knee    Date/Time: 3/26/2025 10:31 AM    Performed by: Bruce Castillo MD  Authorized by: Bruce Castillo MD    Indications:  Pain  Needle Size:  22 G  Guidance: ultrasound    Location:  Knee  Laterality:  Bilateral      Medications (Right):  20 mg sodium hyaluronate 20 MG/2ML  Medications (Left):  20 mg sodium hyaluronate 20 MG/2ML  Outcome:  Tolerated well, no immediate complications  Procedure discussed: discussed risks, benefits, and alternatives    Consent Given by:  Patient  Timeout: timeout called immediately prior to procedure    Prep: patient was prepped and draped in usual sterile fashion